# Patient Record
Sex: MALE | Race: BLACK OR AFRICAN AMERICAN | NOT HISPANIC OR LATINO | Employment: FULL TIME | URBAN - METROPOLITAN AREA
[De-identification: names, ages, dates, MRNs, and addresses within clinical notes are randomized per-mention and may not be internally consistent; named-entity substitution may affect disease eponyms.]

---

## 2017-06-12 ENCOUNTER — HOSPITAL ENCOUNTER (EMERGENCY)
Facility: OTHER | Age: 22
Discharge: HOME | End: 2017-06-12
Attending: EMERGENCY MEDICINE

## 2017-06-12 VITALS
RESPIRATION RATE: 18 BRPM | HEART RATE: 83 BPM | OXYGEN SATURATION: 97 % | TEMPERATURE: 100 F | DIASTOLIC BLOOD PRESSURE: 77 MMHG | SYSTOLIC BLOOD PRESSURE: 132 MMHG

## 2017-06-12 DIAGNOSIS — J30.1 SEASONAL ALLERGIC RHINITIS DUE TO POLLEN: Primary | ICD-10-CM

## 2017-06-12 PROCEDURE — 99283 EMERGENCY DEPT VISIT LOW MDM: CPT

## 2017-06-12 PROCEDURE — 25000003 PHARM REV CODE 250: Performed by: EMERGENCY MEDICINE

## 2017-06-12 RX ORDER — PREDNISONE 10 MG/1
10 TABLET ORAL DAILY
Qty: 5 TABLET | Refills: 0 | Status: SHIPPED | OUTPATIENT
Start: 2017-06-12 | End: 2017-06-17

## 2017-06-12 RX ORDER — ACETAMINOPHEN 500 MG
1000 TABLET ORAL
Status: COMPLETED | OUTPATIENT
Start: 2017-06-12 | End: 2017-06-12

## 2017-06-12 RX ADMIN — ACETAMINOPHEN 1000 MG: 500 TABLET ORAL at 09:06

## 2017-06-13 NOTE — ED NOTES
Patient identifiers verified and correct for Asad Bonner.    LOC: The patient is awake, alert and aware of environment with an appropriate affect, the patient is oriented x 3 and speaking appropriately.  APPEARANCE: Patient resting comfortably and in no acute distress, patient is clean and well groomed, patient's clothing is properly fastened.  SKIN: The skin is warm and dry, color consistent with ethnicity, patient has normal skin turgor and moist mucus membranes, skin intact, no breakdown or bruising noted.  MUSCULOSKELETAL: Patient moving all extremities spontaneously, no obvious swelling or deformities noted.  RESPIRATORY: Airway is open and patent, respirations are spontaneous, patient has a normal effort and rate, no accessory muscle use noted, bilateral breath sounds **.  CARDIAC: Patient has a normal rate and regular rhythm, no periphreal edema noted, capillary refill < 3 seconds.  ABDOMEN: Soft and non tender to palpation, no distention noted, normoactive bowel sounds present in all four quadrants.  NEUROLOGIC: PERRL, **mm bilaterally, eyes open spontaneously, behavior appropriate to situation, follows commands, facial expression symmetrical, bilateral hand grasp equal and even, purposeful motor response noted, normal sensation in all extremities when touched with a finger.

## 2017-06-13 NOTE — ED PROVIDER NOTES
"Encounter Date: 6/12/2017       History     Chief Complaint   Patient presents with    URI     cough, cold, congestion, diarrhea, ha x 1 week. "overheating" 100.4 in triage     Review of patient's allergies indicates:   Allergen Reactions    Shellfish containing products      The history is provided by the patient.   URI   The primary symptoms include cough. Primary symptoms do not include fever, sore throat, nausea or vomiting. The current episode started several days ago. This is a new problem. The problem has not changed since onset.  The cough began 6 to 7 days ago. The cough is non-productive. It is exacerbated by allergens.   Symptoms associated with the illness include chills, congestion and rhinorrhea.     Past Medical History:   Diagnosis Date    Asthma      History reviewed. No pertinent surgical history.  History reviewed. No pertinent family history.  Social History   Substance Use Topics    Smoking status: Never Smoker    Smokeless tobacco: Not on file    Alcohol use No     Review of Systems   Constitutional: Positive for chills. Negative for fever.   HENT: Positive for congestion and rhinorrhea. Negative for sore throat.    Respiratory: Positive for cough.    Gastrointestinal: Positive for diarrhea (One or 2 episodes over the last few days). Negative for nausea and vomiting.   All other systems reviewed and are negative.      Physical Exam     Initial Vitals [06/12/17 2138]   BP Pulse Resp Temp SpO2   (!) 129/90 88 18 (!) 100.4 °F (38 °C) 99 %     Physical Exam    Nursing note and vitals reviewed.  Constitutional: Vital signs are normal. He appears well-developed. He is active and cooperative.   HENT:   Head: Normocephalic and atraumatic.   Nose: Mucosal edema and rhinorrhea present. Right sinus exhibits no maxillary sinus tenderness and no frontal sinus tenderness. Left sinus exhibits no maxillary sinus tenderness and no frontal sinus tenderness.   Eyes: Conjunctivae, EOM and lids are normal. " Pupils are equal, round, and reactive to light.   Neck: Trachea normal and full passive range of motion without pain. Neck supple. No thyroid mass present.   Cardiovascular: Normal rate, regular rhythm, S1 normal, S2 normal, normal heart sounds, intact distal pulses and normal pulses.   Abdominal: Soft. Normal appearance, normal aorta and bowel sounds are normal. There is no tenderness. There is no rigidity, no rebound, no guarding, no CVA tenderness, no tenderness at McBurney's point and negative Bella's sign. No hernia.       Musculoskeletal: Normal range of motion.   Lymphadenopathy:     He has no axillary adenopathy.   Neurological: He is alert and oriented to person, place, and time.   Skin: Skin is warm, dry and intact.   Psychiatric: He has a normal mood and affect. His speech is normal and behavior is normal. Judgment and thought content normal. Cognition and memory are normal.         ED Course   Procedures  Labs Reviewed - No data to display          Medical Decision Making:   ED Management:  Patient has a low-grade temp of 100.4.  Most of his physical findings are consistent with ALLERGIC response however cannot rule out a URI as a less likely diagnosis.  Patient will be treated with low-dose steroid and Motrin.                   ED Course     Clinical Impression:   The encounter diagnosis was Seasonal allergic rhinitis due to pollen.          Lewis Mckeon MD  06/12/17 5648

## 2017-10-03 ENCOUNTER — HOSPITAL ENCOUNTER (EMERGENCY)
Facility: OTHER | Age: 22
Discharge: HOME | End: 2017-10-03
Attending: INTERNAL MEDICINE

## 2017-10-03 VITALS
SYSTOLIC BLOOD PRESSURE: 120 MMHG | DIASTOLIC BLOOD PRESSURE: 76 MMHG | TEMPERATURE: 98 F | OXYGEN SATURATION: 98 % | BODY MASS INDEX: 24.25 KG/M2 | WEIGHT: 160 LBS | HEART RATE: 62 BPM | HEIGHT: 68 IN

## 2017-10-03 DIAGNOSIS — T78.1XXA ALLERGIC REACTION TO FOOD, INITIAL ENCOUNTER: Primary | ICD-10-CM

## 2017-10-03 PROCEDURE — 96374 THER/PROPH/DIAG INJ IV PUSH: CPT

## 2017-10-03 PROCEDURE — 96375 TX/PRO/DX INJ NEW DRUG ADDON: CPT

## 2017-10-03 PROCEDURE — 63600175 PHARM REV CODE 636 W HCPCS: Performed by: INTERNAL MEDICINE

## 2017-10-03 PROCEDURE — 63600175 PHARM REV CODE 636 W HCPCS

## 2017-10-03 PROCEDURE — 99284 EMERGENCY DEPT VISIT MOD MDM: CPT | Mod: 25

## 2017-10-03 RX ORDER — EPINEPHRINE 0.3 MG/.3ML
1 INJECTION SUBCUTANEOUS
Qty: 2 EACH | Refills: 0 | Status: SHIPPED | OUTPATIENT
Start: 2017-10-03 | End: 2019-10-12 | Stop reason: SDUPTHER

## 2017-10-03 RX ORDER — METHYLPREDNISOLONE SOD SUCC 125 MG
VIAL (EA) INJECTION
Status: DISCONTINUED
Start: 2017-10-03 | End: 2017-10-03 | Stop reason: HOSPADM

## 2017-10-03 RX ORDER — DIPHENHYDRAMINE HYDROCHLORIDE 50 MG/ML
50 INJECTION INTRAMUSCULAR; INTRAVENOUS
Status: COMPLETED | OUTPATIENT
Start: 2017-10-03 | End: 2017-10-03

## 2017-10-03 RX ORDER — METHYLPREDNISOLONE SOD SUCC 125 MG
250 VIAL (EA) INJECTION
Status: COMPLETED | OUTPATIENT
Start: 2017-10-03 | End: 2017-10-03

## 2017-10-03 RX ORDER — PREDNISONE 20 MG/1
40 TABLET ORAL DAILY
Qty: 10 TABLET | Refills: 0 | Status: SHIPPED | OUTPATIENT
Start: 2017-10-03 | End: 2017-10-08

## 2017-10-03 RX ORDER — DIPHENHYDRAMINE HYDROCHLORIDE 50 MG/ML
INJECTION INTRAMUSCULAR; INTRAVENOUS
Status: COMPLETED
Start: 2017-10-03 | End: 2017-10-03

## 2017-10-03 RX ADMIN — DIPHENHYDRAMINE HYDROCHLORIDE 50 MG: 50 INJECTION INTRAMUSCULAR; INTRAVENOUS at 05:10

## 2017-10-03 RX ADMIN — DIPHENHYDRAMINE HYDROCHLORIDE 50 MG: 50 INJECTION, SOLUTION INTRAMUSCULAR; INTRAVENOUS at 05:10

## 2017-10-03 RX ADMIN — METHYLPREDNISOLONE SODIUM SUCCINATE 250 MG: 125 INJECTION, POWDER, FOR SOLUTION INTRAMUSCULAR; INTRAVENOUS at 05:10

## 2017-10-03 NOTE — ED PROVIDER NOTES
Encounter Date: 10/3/2017       History     Chief Complaint   Patient presents with    Allergic Reaction     reports drinking juice that a family member has touched after preparing seafood, pt allergic to seafood, clearing throat very frequently, reports thraot scratchy, able to speak in short sentences     22-year-old male presents to the emergency department complaining of discomfort after eating a seafood particle mistakenly today.  One of patient's younger relatives left a seafood particle on a juice box and the patient did not realize the particle was on a juice box before he drank it.      The history is provided by the patient. No  was used.   Allergic Reaction   Primary symptoms comment: Throat discomfort. The current episode started 1 to 2 hours ago. The problem has not changed since onset.  The onset of the reaction was associated with eating. Significant symptoms that are not present include eye redness, flushing, rhinorrhea or itching.     Review of patient's allergies indicates:   Allergen Reactions    Shellfish containing products      Past Medical History:   Diagnosis Date    Asthma      History reviewed. No pertinent surgical history.  History reviewed. No pertinent family history.  Social History   Substance Use Topics    Smoking status: Current Every Day Smoker    Smokeless tobacco: Never Used    Alcohol use No     Review of Systems   Constitutional: Negative.    HENT: Positive for sore throat. Negative for rhinorrhea and trouble swallowing.    Eyes: Negative for redness.   Respiratory: Negative.    Cardiovascular: Negative.    Skin: Negative for flushing and itching.   All other systems reviewed and are negative.      Physical Exam     Initial Vitals   BP Pulse Resp Temp SpO2   10/03/17 1726 10/03/17 1726 -- 10/03/17 1731 10/03/17 1726   133/77 70  98 °F (36.7 °C) 99 %      MAP       10/03/17 1726       95.67         Physical Exam    Nursing note and vitals  reviewed.  Constitutional: He appears well-developed and well-nourished.   HENT:   Head: Normocephalic and atraumatic.   Slight posterior oropharyngeal erythema with slight edema and no exudate   Eyes: Conjunctivae and EOM are normal.   Neck: Normal range of motion.   Cardiovascular: Normal rate, regular rhythm and normal heart sounds.   Pulmonary/Chest: Breath sounds normal. No respiratory distress. He has no wheezes.   Abdominal: Soft. Bowel sounds are normal. He exhibits no distension.   Musculoskeletal: Normal range of motion.   Neurological: He is alert.   Skin: Skin is warm and dry.   Psychiatric: He has a normal mood and affect. His behavior is normal. Thought content normal.         ED Course   Procedures  Labs Reviewed - No data to display          Medical Decision Making:   Initial Assessment:   22-year-old male presents to the emergency department complaining of discomfort after eating a seafood particle mistakenly today.  One of patient's younger relatives left a seafood particle on a juice box and the patient did not realize the particle was on a juice box before he drank it.    Differential Diagnosis:   ALLERGIC reaction to seafood  ED Management:  Patient was given Solu-Medrol and Benadryl intravenously after saline lock was placed.  Patient initially was clearing his throat approximately every 7-10 seconds.  Within a matter of 10 minutes of receiving Solu-Medrol and Benadryl, patient states his throat is back to normal.  Patient states he does not have an EpiPen at home, so he was given a prescription for an EpiPen and a prednisone burst.  He was advised to follow-up with his primary care physician tomorrow for reevaluation.                   ED Course      Clinical Impression:   The encounter diagnosis was Allergic reaction to food, initial encounter.    Disposition:   Disposition: Discharged  Condition: Stable                        Howard Griggs MD  10/03/17 5341

## 2019-06-30 ENCOUNTER — HOSPITAL ENCOUNTER (EMERGENCY)
Facility: HOSPITAL | Age: 24
Discharge: HOME | End: 2019-06-30
Attending: EMERGENCY MEDICINE

## 2019-06-30 VITALS
HEART RATE: 68 BPM | SYSTOLIC BLOOD PRESSURE: 128 MMHG | WEIGHT: 160 LBS | OXYGEN SATURATION: 100 % | TEMPERATURE: 98 F | DIASTOLIC BLOOD PRESSURE: 74 MMHG | RESPIRATION RATE: 18 BRPM | BODY MASS INDEX: 24.33 KG/M2

## 2019-06-30 DIAGNOSIS — N34.1 URETHRITIS, NONSPECIFIC: Primary | ICD-10-CM

## 2019-06-30 PROCEDURE — 25000003 PHARM REV CODE 250: Mod: ER | Performed by: EMERGENCY MEDICINE

## 2019-06-30 PROCEDURE — 63600175 PHARM REV CODE 636 W HCPCS: Mod: ER | Performed by: EMERGENCY MEDICINE

## 2019-06-30 PROCEDURE — 99284 EMERGENCY DEPT VISIT MOD MDM: CPT | Mod: 25,ER

## 2019-06-30 PROCEDURE — 96372 THER/PROPH/DIAG INJ SC/IM: CPT | Mod: ER

## 2019-06-30 PROCEDURE — 87491 CHLMYD TRACH DNA AMP PROBE: CPT

## 2019-06-30 RX ORDER — AZITHROMYCIN 250 MG/1
1000 TABLET, FILM COATED ORAL
Status: COMPLETED | OUTPATIENT
Start: 2019-06-30 | End: 2019-06-30

## 2019-06-30 RX ORDER — CEFTRIAXONE 250 MG/1
250 INJECTION, POWDER, FOR SOLUTION INTRAMUSCULAR; INTRAVENOUS
Status: COMPLETED | OUTPATIENT
Start: 2019-06-30 | End: 2019-06-30

## 2019-06-30 RX ADMIN — AZITHROMYCIN MONOHYDRATE 1000 MG: 250 TABLET ORAL at 11:06

## 2019-06-30 RX ADMIN — CEFTRIAXONE SODIUM 250 MG: 250 INJECTION, POWDER, FOR SOLUTION INTRAMUSCULAR; INTRAVENOUS at 11:06

## 2019-06-30 NOTE — ED PROVIDER NOTES
"Encounter Date: 6/30/2019    SCRIBE #1 NOTE: I, Bianca Fuentes, am scribing for, and in the presence of,  Dr. Coronel. I have scribed the following portions of the note - Other sections scribed: HPI, ROS, PE.       History     Chief Complaint   Patient presents with    Exposure to STD     Pt states," Yellow discharge and pain when I pee."     23 year old male complaining of dysuria x 2 days. Patient is experiencing yellow discharge beginning today. He denies fever, nausea, vomiting, and abdominal pain. Patient reports having unprotected oral sex with someone who was recently diagnosed with gonorrhea    The history is provided by the patient. No  was used.     Review of patient's allergies indicates:   Allergen Reactions    Shellfish containing products      Past Medical History:   Diagnosis Date    Asthma      History reviewed. No pertinent surgical history.  History reviewed. No pertinent family history.  Social History     Tobacco Use    Smoking status: Current Every Day Smoker    Smokeless tobacco: Never Used   Substance Use Topics    Alcohol use: No    Drug use: Not on file     Review of Systems   Constitutional: Negative for fever.   Eyes: Negative for redness.   Gastrointestinal: Negative for abdominal pain, nausea and vomiting.   Genitourinary: Positive for discharge (yellow) and dysuria.   Skin: Negative for rash and wound.       Physical Exam     Initial Vitals [06/30/19 1047]   BP Pulse Resp Temp SpO2   138/72 67 16 98.5 °F (36.9 °C) 100 %      MAP       --         Physical Exam    Nursing note and vitals reviewed.  Constitutional: He appears well-developed and well-nourished.   HENT:   Head: Normocephalic and atraumatic.   Right Ear: External ear normal.   Left Ear: External ear normal.   Eyes: Conjunctivae are normal.   Neck: Neck supple.   Pulmonary/Chest: No respiratory distress.   Abdominal: Normal appearance.   Genitourinary: Circumcised. Discharge (yellow) found. "   Neurological: He is alert and oriented to person, place, and time.   Skin: Skin is warm and dry.   Psychiatric: He has a normal mood and affect.         ED Course   Procedures  Labs Reviewed   C. TRACHOMATIS/N. GONORRHOEAE BY AMP DNA          Imaging Results    None          Medical Decision Making:   Initial Assessment:   23 year old male with dysuria x 2 days and yellow penile discharge since yesterday. Patient denies fever, nausea, vomiting, or abdominal pain. Physical exam is significant for circumcised penis and yellow penile discharge.   ED Management:  Will treat with cefTRIAXone injection 250 mg and azithromycin tablet 1,000 mg.  Will order C. trachomatis/N. gonorrhoeae by AMP DNA.                Scribe Attestation:   Scribe #1: I performed the above scribed service and the documentation accurately describes the services I performed. I attest to the accuracy of the note.           I, Dr. Joanne Coronel, personally performed the services described in this documentation. All medical record entries made by the scribe were at my direction and in my presence.  I have reviewed the chart and agree that the record reflects my personal performance and is accurate and complete. Joanne Coronel MD.  12:26 PM 06/30/2019          Clinical Impression:     1. Urethritis, nonspecific                                   Joanne Coronel MD  06/30/19 6069

## 2019-07-01 LAB
C TRACH DNA SPEC QL NAA+PROBE: NOT DETECTED
N GONORRHOEA DNA SPEC QL NAA+PROBE: DETECTED

## 2019-10-11 ENCOUNTER — HOSPITAL ENCOUNTER (EMERGENCY)
Facility: HOSPITAL | Age: 24
Discharge: HOME | End: 2019-10-12
Attending: EMERGENCY MEDICINE

## 2019-10-11 DIAGNOSIS — T78.40XA ALLERGIC REACTION, INITIAL ENCOUNTER: ICD-10-CM

## 2019-10-11 DIAGNOSIS — L50.9 HIVES: Primary | ICD-10-CM

## 2019-10-11 PROCEDURE — 63600175 PHARM REV CODE 636 W HCPCS: Performed by: EMERGENCY MEDICINE

## 2019-10-11 PROCEDURE — 99284 PR EMERGENCY DEPT VISIT,LEVEL IV: ICD-10-PCS | Mod: ,,, | Performed by: EMERGENCY MEDICINE

## 2019-10-11 PROCEDURE — 99283 EMERGENCY DEPT VISIT LOW MDM: CPT

## 2019-10-11 PROCEDURE — 99284 EMERGENCY DEPT VISIT MOD MDM: CPT | Mod: ,,, | Performed by: EMERGENCY MEDICINE

## 2019-10-11 RX ADMIN — DEXAMETHASONE 10 MG: 4 TABLET ORAL at 11:10

## 2019-10-12 VITALS
WEIGHT: 175 LBS | DIASTOLIC BLOOD PRESSURE: 60 MMHG | HEIGHT: 66 IN | BODY MASS INDEX: 28.12 KG/M2 | RESPIRATION RATE: 16 BRPM | TEMPERATURE: 98 F | HEART RATE: 68 BPM | OXYGEN SATURATION: 98 % | SYSTOLIC BLOOD PRESSURE: 101 MMHG

## 2019-10-12 RX ORDER — EPINEPHRINE 0.3 MG/.3ML
1 INJECTION SUBCUTANEOUS
Qty: 2 EACH | Refills: 0 | Status: SHIPPED | OUTPATIENT
Start: 2019-10-12 | End: 2020-10-11

## 2019-10-12 NOTE — DISCHARGE INSTRUCTIONS
Benadryl PRN (as needed) for signs and symptoms of allergic reaction (I.e. Itching, hives, etc.) Should you become short of breath or begin to experience facial swelling, unable to tolerate own secretions, or difficulty breathing use Epipen as prescribed to you. Should symptoms not jewels with at home medications, seek emergent medical attention.

## 2019-10-12 NOTE — ED PROVIDER NOTES
Encounter Date: 10/11/2019       History     Chief Complaint   Patient presents with    Abdominal Pain     Patient ate seafood at wedding this evening, patient has seafood allergy. Now c/o abdominal pain. Minimal swelling noted to eyes. ETOH on board. Patient denies any SOB    Allergic Reaction     Mr. Bonner is a 24-year-old  male with no known past medical history who presented to [facility] ED on 10/11 via EMS after patient ingested shrimp at a reception. Patient with known seafood allergy, experienced hives involving bilateral cheeks. Also with complaints of anxiety and nausea at the time. He reports alcohol consumption tonight. En route to the ED, patient received 50 mg Benadryl. At time of presentation patients' symptoms appear to have resolved. Patients' vitals stable with /72 mmHg, HR 72 bpm, afebrile with temperature of 98.4 F, and RR 18 saturating 99% on room air. Currently he denies any symptoms of facial swallowing, globus sensation in throat, SOB, or chest pain.        Review of patient's allergies indicates:   Allergen Reactions    Shellfish containing products      Past Medical History:   Diagnosis Date    Asthma      No past surgical history on file.  No family history on file.  Social History     Tobacco Use    Smoking status: Current Every Day Smoker    Smokeless tobacco: Never Used   Substance Use Topics    Alcohol use: No    Drug use: Not on file     Review of Systems   Constitutional: Negative for chills, diaphoresis and fever.   HENT: Negative for drooling, facial swelling and trouble swallowing.    Eyes: Negative for pain and visual disturbance.   Respiratory: Negative for cough, choking, chest tightness, shortness of breath and wheezing.    Cardiovascular: Negative for chest pain and leg swelling.   Gastrointestinal: Positive for nausea. Negative for abdominal distention, abdominal pain, constipation, diarrhea and vomiting.   Genitourinary: Negative for dysuria and  hematuria.   Musculoskeletal: Negative for back pain and neck pain.   Skin: Positive for rash. Negative for pallor.   Neurological: Negative for dizziness and headaches.   Psychiatric/Behavioral: Negative for confusion. The patient is nervous/anxious.        Physical Exam     Initial Vitals [10/11/19 2243]   BP Pulse Resp Temp SpO2   107/72 72 18 98.4 °F (36.9 °C) 99 %      MAP       --         Physical Exam    Nursing note and vitals reviewed.  Constitutional: He appears well-developed and well-nourished.  Non-toxic appearance. He does not appear ill. No distress.   HENT:   Head: Normocephalic and atraumatic.   Mouth/Throat: Oropharynx is clear and moist. No oropharyngeal exudate.   Eyes: Conjunctivae and EOM are normal. Pupils are equal, round, and reactive to light. No scleral icterus.   Neck: Normal range of motion. Neck supple.   Cardiovascular: Normal rate, regular rhythm, normal heart sounds and intact distal pulses. Exam reveals no gallop and no friction rub.    No murmur heard.  Pulmonary/Chest: Breath sounds normal. No stridor. He has no wheezes. He has no rhonchi. He has no rales.   Abdominal: Soft. Bowel sounds are normal. He exhibits no distension. There is no tenderness.   Musculoskeletal: He exhibits no edema.   Neurological: He is alert and oriented to person, place, and time. He has normal strength.   Skin: Skin is warm and dry.   Psychiatric: He has a normal mood and affect. Thought content normal.   Somnolent s/p 50 mg Benadryl and alcohol, but easily arousable           ED Course   Procedures  Labs Reviewed - No data to display       Imaging Results    None          Medical Decision Making:   History:   Old Medical Records: I decided to obtain old medical records.  Initial Assessment:   24 AAM with known seafood allergy presented to [facility] ED on 10/11 via EMS after patient ingested shrimp at a reception began experiencing hives involving bilateral cheeks and some anxiety and nausea. En route to the  ED, patient received 50 mg Benadryl. At time of presentation patients' symptoms appear to have resolved. Patients' vitals stable with /72 mmHg, HR 72 bpm, afebrile with temperature of 98.4 F, and RR 18 saturating 99% on room air. On physical exam symptoms appear to have resolved. Currently he denies any symptoms of facial swelling, globus sensation in throat, SOB, or chest pain.        Differential Diagnosis:   Differential Diagnosis includes but is not limited to:  Hives  Allergic reaction  Anaphylaxis  Alcohol intoxication   ED Management:  11:46 PM  Patient seen and examined. Symptoms seem to have resolved. Will give dexamethasone 10 mg PO now and continue to monitor.    1:07 AM  Patient with marked improvement in symptoms s/p Benadryl and dexamethasone. Will refill prescription for Epipen.                       Clinical Impression:       ICD-10-CM ICD-9-CM   1. Hives L50.9 708.9   2. Allergic reaction, initial encounter T78.40XA 995.3                                Cristobal Her MD  Resident  10/12/19 0109

## 2019-10-12 NOTE — ED TRIAGE NOTES
Pt reports eating seafood at a wedding reception and c/o facial hives that resolved PTA. Pt has minimal swelling to eyes. No other complaints asked. Alcohol on board.

## 2019-10-12 NOTE — ED NOTES
Pt awake and alert; resting quietly on stretcher. VS stable. Pt denies pain at this time; no acute distress or discomfort reported or observed.  Pt denies restroom needs at this time; is able to reposition self on stretcher. Bed locked in lowest position; side rails up and locked x 2; call light, bedside table, and personal belongings within reach. Room assessed for safety measures and cleanliness; no action needed at this time. Plan of care discussed.  Pt instructed to alert nurse for assistance and before attempting to get out of bed; verbalizes understanding. Pt denies needs or complaints at this time; will continue to monitor.

## 2020-03-21 ENCOUNTER — HOSPITAL ENCOUNTER (EMERGENCY)
Facility: HOSPITAL | Age: 25
Discharge: HOME | End: 2020-03-21
Attending: EMERGENCY MEDICINE
Payer: COMMERCIAL

## 2020-03-21 VITALS
SYSTOLIC BLOOD PRESSURE: 132 MMHG | WEIGHT: 157 LBS | DIASTOLIC BLOOD PRESSURE: 80 MMHG | BODY MASS INDEX: 24.64 KG/M2 | OXYGEN SATURATION: 100 % | RESPIRATION RATE: 20 BRPM | HEART RATE: 82 BPM | HEIGHT: 67 IN | TEMPERATURE: 99 F

## 2020-03-21 DIAGNOSIS — R10.33 PERIUMBILICAL ABDOMINAL PAIN: ICD-10-CM

## 2020-03-21 DIAGNOSIS — R11.2 NON-INTRACTABLE VOMITING WITH NAUSEA, UNSPECIFIED VOMITING TYPE: Primary | ICD-10-CM

## 2020-03-21 DIAGNOSIS — R30.0 DYSURIA: ICD-10-CM

## 2020-03-21 LAB
BACTERIA #/AREA URNS HPF: ABNORMAL /HPF
BILIRUB UR QL STRIP: NEGATIVE
CLARITY UR: CLEAR
COLOR UR: YELLOW
GLUCOSE UR QL STRIP: NEGATIVE
HGB UR QL STRIP: NEGATIVE
KETONES UR QL STRIP: ABNORMAL
LEUKOCYTE ESTERASE UR QL STRIP: ABNORMAL
MICROSCOPIC COMMENT: ABNORMAL
NITRITE UR QL STRIP: NEGATIVE
PH UR STRIP: 6 [PH] (ref 5–8)
PROT UR QL STRIP: NEGATIVE
SP GR UR STRIP: 1.02 (ref 1–1.03)
URN SPEC COLLECT METH UR: ABNORMAL
UROBILINOGEN UR STRIP-ACNC: ABNORMAL EU/DL
WBC #/AREA URNS HPF: 35 /HPF (ref 0–5)

## 2020-03-21 PROCEDURE — 87086 URINE CULTURE/COLONY COUNT: CPT

## 2020-03-21 PROCEDURE — 63600175 PHARM REV CODE 636 W HCPCS: Performed by: PHYSICIAN ASSISTANT

## 2020-03-21 PROCEDURE — 81000 URINALYSIS NONAUTO W/SCOPE: CPT

## 2020-03-21 PROCEDURE — 99284 EMERGENCY DEPT VISIT MOD MDM: CPT | Mod: 25

## 2020-03-21 PROCEDURE — 25000003 PHARM REV CODE 250: Performed by: PHYSICIAN ASSISTANT

## 2020-03-21 PROCEDURE — 87591 N.GONORRHOEAE DNA AMP PROB: CPT

## 2020-03-21 PROCEDURE — 96372 THER/PROPH/DIAG INJ SC/IM: CPT

## 2020-03-21 RX ORDER — LIDOCAINE HYDROCHLORIDE 10 MG/ML
5 INJECTION INFILTRATION; PERINEURAL
Status: COMPLETED | OUTPATIENT
Start: 2020-03-21 | End: 2020-03-21

## 2020-03-21 RX ORDER — AZITHROMYCIN 250 MG/1
1000 TABLET, FILM COATED ORAL
Status: COMPLETED | OUTPATIENT
Start: 2020-03-21 | End: 2020-03-21

## 2020-03-21 RX ORDER — DICYCLOMINE HYDROCHLORIDE 20 MG/1
20 TABLET ORAL 4 TIMES DAILY PRN
Qty: 30 TABLET | Refills: 0 | Status: SHIPPED | OUTPATIENT
Start: 2020-03-21 | End: 2020-04-20

## 2020-03-21 RX ORDER — PANTOPRAZOLE SODIUM 20 MG/1
20 TABLET, DELAYED RELEASE ORAL DAILY
Qty: 30 TABLET | Refills: 0 | Status: SHIPPED | OUTPATIENT
Start: 2020-03-21 | End: 2021-03-21

## 2020-03-21 RX ORDER — CEFTRIAXONE 1 G/1
250 INJECTION, POWDER, FOR SOLUTION INTRAMUSCULAR; INTRAVENOUS
Status: COMPLETED | OUTPATIENT
Start: 2020-03-21 | End: 2020-03-21

## 2020-03-21 RX ORDER — ONDANSETRON 4 MG/1
4 TABLET, ORALLY DISINTEGRATING ORAL EVERY 6 HOURS PRN
Qty: 20 TABLET | Refills: 0 | Status: SHIPPED | OUTPATIENT
Start: 2020-03-21

## 2020-03-21 RX ADMIN — LIDOCAINE HYDROCHLORIDE 5 ML: 10 INJECTION, SOLUTION INFILTRATION; PERINEURAL at 11:03

## 2020-03-21 RX ADMIN — CEFTRIAXONE SODIUM 250 MG: 1 INJECTION, POWDER, FOR SOLUTION INTRAMUSCULAR; INTRAVENOUS at 11:03

## 2020-03-21 RX ADMIN — AZITHROMYCIN 1000 MG: 250 TABLET, FILM COATED ORAL at 11:03

## 2020-03-22 NOTE — ED PROVIDER NOTES
Encounter Date: 3/21/2020       History     Chief Complaint   Patient presents with    Nausea     started today after he ate wings at dinner    Vomiting    Abdominal Pain     cramping     21-year-old male with history of asthma presents to ED complaining of acute onset nausea vomiting abdominal pain after eating some fried wings from a local restaurant.  Cramping periumbilical pain.  One episode of nonbloody nonbilious emesis.  Denies history of any GI issues.  No diarrhea.  He does admit to history of constipation, last BM approximately 2-3 days ago, which he states is normal.  Does not feel distended or bloated, does not suspect significant constipation.    Also with a few days worth of dysuria, primarily in the mornings and in the evenings.  No hematuria.  No flank pain.  No history of frequent UTIs.  No suspicion for STI.    No URI symptoms.  No fever.  No shortness of breath.        Review of patient's allergies indicates:   Allergen Reactions    Shellfish containing products      Past Medical History:   Diagnosis Date    Asthma      No past surgical history on file.  No family history on file.  Social History     Tobacco Use    Smoking status: Current Every Day Smoker    Smokeless tobacco: Never Used   Substance Use Topics    Alcohol use: No    Drug use: Not on file     Review of Systems   Constitutional: Negative for chills and fever.   HENT: Negative for congestion.    Eyes: Negative for discharge and redness.   Respiratory: Negative for cough, chest tightness and shortness of breath.    Cardiovascular: Negative for leg swelling.   Gastrointestinal: Positive for abdominal pain, constipation, nausea and vomiting. Negative for blood in stool and diarrhea.   Genitourinary: Positive for dysuria. Negative for flank pain, frequency, penile pain and testicular pain.   Musculoskeletal: Negative for myalgias, neck pain and neck stiffness.   Skin: Negative for rash.   Neurological: Negative for dizziness and  headaches.       Physical Exam     Initial Vitals [03/21/20 2109]   BP Pulse Resp Temp SpO2   129/77 75 15 98.6 °F (37 °C) 97 %      MAP       --         Physical Exam    Nursing note and vitals reviewed.  Constitutional: He appears well-developed and well-nourished. He is not diaphoretic. No distress.   Well-appearing and nontoxic.  Resting comfortably on exam table.   HENT:   Head: Normocephalic and atraumatic.   Eyes: EOM are normal.   Neck: Neck supple.   Cardiovascular: Intact distal pulses.   Pulmonary/Chest: No respiratory distress.   Abdominal:   Abdomen overall soft, normal bowel sounds ×4. Mild ttp periumbilical region.  No rebound or guarding.  No palpable mass or distention.  No flank or CVA tenderness.  Negative Bella sign.  No pain over McBurney's point.   Neurological: He is alert and oriented to person, place, and time.   Skin: Skin is warm and dry.   Psychiatric: He has a normal mood and affect. Thought content normal.         ED Course   Procedures  Labs Reviewed   URINALYSIS, REFLEX TO URINE CULTURE - Abnormal; Notable for the following components:       Result Value    Ketones, UA Trace (*)     Urobilinogen, UA 2.0-3.0 (*)     Leukocytes, UA 2+ (*)     All other components within normal limits    Narrative:     Preferred Collection Type->Urine, Clean Catch   URINALYSIS MICROSCOPIC - Abnormal; Notable for the following components:    WBC, UA 35 (*)     Bacteria Few (*)     All other components within normal limits    Narrative:     Preferred Collection Type->Urine, Clean Catch   CULTURE, URINE   C. TRACHOMATIS/N. GONORRHOEAE BY AMP DNA          Imaging Results    None          Medical Decision Making:   Differential Diagnosis:   UTI, STI, viral gastroenteritis, GERD, gastritis, constipation, diverticulosis, diverticulitis  ED Management:  No local peritoneal signs, do not suspect surgical abdomen. Supportive measures for likely viral gastroenteritis vs nonspecific enteritis 2/2 recent foods. No  fever. No bloody or mucoid stools. No significant comorbidities. No SOB, no URI complaints, no COVID contacts. Do not think represents coronavirus GI presentation; regardless, do not feel need for admit or further intervention. Home quarantine, supportive care.     Hx STI; given urine and dysuria, will empirically treat.                                  Clinical Impression:       ICD-10-CM ICD-9-CM   1. Non-intractable vomiting with nausea, unspecified vomiting type R11.2 787.01   2. Dysuria R30.0 788.1   3. Periumbilical abdominal pain R10.33 789.05         Disposition:   Disposition: Discharged  Condition: Stable     ED Disposition Condition    Discharge Stable        ED Prescriptions     Medication Sig Dispense Start Date End Date Auth. Provider    dicyclomine (BENTYL) 20 mg tablet Take 1 tablet (20 mg total) by mouth 4 (four) times daily as needed (abdominal cramping). 30 tablet 3/21/2020 4/20/2020 John Donovan PA-C    ondansetron (ZOFRAN-ODT) 4 MG TbDL Take 1 tablet (4 mg total) by mouth every 6 (six) hours as needed (nausea). 20 tablet 3/21/2020  John Donovan PA-C    pantoprazole (PROTONIX) 20 MG tablet Take 1 tablet (20 mg total) by mouth once daily. 30 tablet 3/21/2020 3/21/2021 John Donovan PA-C        Follow-up Information     Follow up With Specialties Details Why Contact [facility] - Family Medicine Family Medicine Schedule an appointment as soon as possible for a visit  To establish primary care physician, For reevaluation [address] [facility] [address]  [phone]      [facility]  Schedule an appointment as soon as possible for a visit  To establish primary care physician, for reevaluation [address] [place] [address]  [phone]      [facility] Emergency Medicine  As needed, If symptoms worsen [address]  [facility] [address] [phone]                                     John Donovan  LISA  03/22/20 0426

## 2020-03-23 LAB
BACTERIA UR CULT: NO GROWTH
C TRACH DNA SPEC QL NAA+PROBE: DETECTED
N GONORRHOEA DNA SPEC QL NAA+PROBE: DETECTED

## 2020-08-24 ENCOUNTER — ANESTHESIA EVENT (OUTPATIENT)
Dept: SURGERY | Facility: HOSPITAL | Age: 25
End: 2020-08-24
Payer: COMMERCIAL

## 2020-08-24 ENCOUNTER — HOSPITAL ENCOUNTER (OUTPATIENT)
Facility: HOSPITAL | Age: 25
Discharge: HOME | End: 2020-08-26
Attending: EMERGENCY MEDICINE | Admitting: SURGERY
Payer: COMMERCIAL

## 2020-08-24 DIAGNOSIS — Z01.810 PREOP CARDIOVASCULAR EXAM: ICD-10-CM

## 2020-08-24 DIAGNOSIS — K37 APPENDICITIS, UNSPECIFIED APPENDICITIS TYPE: Primary | ICD-10-CM

## 2020-08-24 DIAGNOSIS — K37 APPENDICITIS: ICD-10-CM

## 2020-08-24 DIAGNOSIS — R10.9 ABDOMINAL PAIN: ICD-10-CM

## 2020-08-24 LAB
ABO + RH BLD: NORMAL
ALBUMIN SERPL-MCNC: 4.2 G/DL (ref 3.3–5.5)
ALBUMIN SERPL-MCNC: 4.3 G/DL (ref 3.3–5.5)
ALLENS TEST: ABNORMAL
ALP SERPL-CCNC: 58 U/L (ref 42–141)
ALP SERPL-CCNC: 63 U/L (ref 42–141)
BILIRUB SERPL-MCNC: 0.6 MG/DL (ref 0.2–1.6)
BILIRUB SERPL-MCNC: 0.6 MG/DL (ref 0.2–1.6)
BILIRUBIN, POC UA: ABNORMAL
BLD GP AB SCN CELLS X3 SERPL QL: NORMAL
BLOOD, POC UA: NEGATIVE
BUN SERPL-MCNC: 7 MG/DL (ref 7–22)
CALCIUM SERPL-MCNC: 10 MG/DL (ref 8–10.3)
CHLORIDE SERPL-SCNC: 101 MMOL/L (ref 98–108)
CLARITY, POC UA: CLEAR
COLOR, POC UA: YELLOW
CREAT SERPL-MCNC: 0.9 MG/DL (ref 0.6–1.2)
CTP QC/QA: YES
GLUCOSE SERPL-MCNC: 102 MG/DL (ref 73–118)
GLUCOSE, POC UA: NEGATIVE
HCO3 UR-SCNC: 22.5 MMOL/L (ref 24–28)
KETONES, POC UA: NEGATIVE
LDH SERPL L TO P-CCNC: 4.07 MMOL/L (ref 0.5–2.2)
LEUKOCYTE EST, POC UA: NEGATIVE
NITRITE, POC UA: NEGATIVE
PCO2 BLDA: 34.3 MMHG (ref 35–45)
PH SMN: 7.42 [PH] (ref 7.35–7.45)
PH UR STRIP: 5.5 [PH]
PO2 BLDA: 29 MMHG (ref 40–60)
POC ALT (SGPT): 22 U/L (ref 10–47)
POC ALT (SGPT): 22 U/L (ref 10–47)
POC AMYLASE: 50 U/L (ref 14–97)
POC AST (SGOT): 26 U/L (ref 11–38)
POC AST (SGOT): 33 U/L (ref 11–38)
POC BE: -1 MMOL/L
POC GGT: 30 U/L (ref 5–65)
POC PTINR: 1.2 (ref 0.9–1.2)
POC PTWBT: 13.9 SEC (ref 9.7–14.3)
POC SATURATED O2: 58 % (ref 95–100)
POC TCO2: 24 MMOL/L (ref 24–29)
POC TCO2: 25 MMOL/L (ref 18–33)
POTASSIUM BLD-SCNC: 4 MMOL/L (ref 3.6–5.1)
PROTEIN, POC UA: NEGATIVE
PROTEIN, POC: 8.1 G/DL (ref 6.4–8.1)
PROTEIN, POC: 8.1 G/DL (ref 6.4–8.1)
SAMPLE: ABNORMAL
SAMPLE: NORMAL
SARS-COV-2 RDRP RESP QL NAA+PROBE: NEGATIVE
SITE: ABNORMAL
SODIUM BLD-SCNC: 142 MMOL/L (ref 128–145)
SPECIFIC GRAVITY, POC UA: >=1.03
UROBILINOGEN, POC UA: 0.2 E.U./DL

## 2020-08-24 PROCEDURE — 99291 CRITICAL CARE FIRST HOUR: CPT | Mod: 25,ER

## 2020-08-24 PROCEDURE — 63600175 PHARM REV CODE 636 W HCPCS: Mod: ER | Performed by: EMERGENCY MEDICINE

## 2020-08-24 PROCEDURE — 93005 ELECTROCARDIOGRAM TRACING: CPT | Mod: ER

## 2020-08-24 PROCEDURE — 85610 PROTHROMBIN TIME: CPT | Mod: ER

## 2020-08-24 PROCEDURE — 25500020 PHARM REV CODE 255: Mod: ER | Performed by: EMERGENCY MEDICINE

## 2020-08-24 PROCEDURE — 80053 COMPREHEN METABOLIC PANEL: CPT | Mod: ER

## 2020-08-24 PROCEDURE — 82803 BLOOD GASES ANY COMBINATION: CPT | Mod: ER

## 2020-08-24 PROCEDURE — 96361 HYDRATE IV INFUSION ADD-ON: CPT

## 2020-08-24 PROCEDURE — 87040 BLOOD CULTURE FOR BACTERIA: CPT | Mod: 59

## 2020-08-24 PROCEDURE — 63600175 PHARM REV CODE 636 W HCPCS: Performed by: EMERGENCY MEDICINE

## 2020-08-24 PROCEDURE — G0378 HOSPITAL OBSERVATION PER HR: HCPCS

## 2020-08-24 PROCEDURE — 85025 COMPLETE CBC W/AUTO DIFF WBC: CPT | Mod: ER

## 2020-08-24 PROCEDURE — 36415 COLL VENOUS BLD VENIPUNCTURE: CPT

## 2020-08-24 PROCEDURE — S0028 INJECTION, FAMOTIDINE, 20 MG: HCPCS | Mod: ER | Performed by: EMERGENCY MEDICINE

## 2020-08-24 PROCEDURE — 96365 THER/PROPH/DIAG IV INF INIT: CPT

## 2020-08-24 PROCEDURE — 96375 TX/PRO/DX INJ NEW DRUG ADDON: CPT | Mod: ER

## 2020-08-24 PROCEDURE — 82150 ASSAY OF AMYLASE: CPT | Mod: ER

## 2020-08-24 PROCEDURE — 86850 RBC ANTIBODY SCREEN: CPT

## 2020-08-24 PROCEDURE — 25000003 PHARM REV CODE 250: Performed by: STUDENT IN AN ORGANIZED HEALTH CARE EDUCATION/TRAINING PROGRAM

## 2020-08-24 PROCEDURE — 25000003 PHARM REV CODE 250: Mod: ER | Performed by: EMERGENCY MEDICINE

## 2020-08-24 PROCEDURE — 96376 TX/PRO/DX INJ SAME DRUG ADON: CPT

## 2020-08-24 PROCEDURE — U0002 COVID-19 LAB TEST NON-CDC: HCPCS | Mod: ER | Performed by: EMERGENCY MEDICINE

## 2020-08-24 PROCEDURE — 87086 URINE CULTURE/COLONY COUNT: CPT

## 2020-08-24 PROCEDURE — 81003 URINALYSIS AUTO W/O SCOPE: CPT | Mod: ER

## 2020-08-24 PROCEDURE — 94761 N-INVAS EAR/PLS OXIMETRY MLT: CPT

## 2020-08-24 PROCEDURE — 25000003 PHARM REV CODE 250: Performed by: EMERGENCY MEDICINE

## 2020-08-24 PROCEDURE — 96361 HYDRATE IV INFUSION ADD-ON: CPT | Mod: ER

## 2020-08-24 RX ORDER — METHYLPREDNISOLONE SOD SUCC 125 MG
125 VIAL (EA) INJECTION
Status: COMPLETED | OUTPATIENT
Start: 2020-08-24 | End: 2020-08-24

## 2020-08-24 RX ORDER — HYDROMORPHONE HYDROCHLORIDE 2 MG/ML
1 INJECTION, SOLUTION INTRAMUSCULAR; INTRAVENOUS; SUBCUTANEOUS EVERY 6 HOURS PRN
Status: DISCONTINUED | OUTPATIENT
Start: 2020-08-24 | End: 2020-08-25

## 2020-08-24 RX ORDER — SODIUM CHLORIDE 9 MG/ML
1000 INJECTION, SOLUTION INTRAVENOUS CONTINUOUS
Status: DISCONTINUED | OUTPATIENT
Start: 2020-08-24 | End: 2020-08-25

## 2020-08-24 RX ORDER — ONDANSETRON 2 MG/ML
8 INJECTION INTRAMUSCULAR; INTRAVENOUS
Status: COMPLETED | OUTPATIENT
Start: 2020-08-24 | End: 2020-08-24

## 2020-08-24 RX ORDER — DIPHENHYDRAMINE HYDROCHLORIDE 50 MG/ML
50 INJECTION INTRAMUSCULAR; INTRAVENOUS
Status: COMPLETED | OUTPATIENT
Start: 2020-08-24 | End: 2020-08-24

## 2020-08-24 RX ORDER — OXYCODONE AND ACETAMINOPHEN 10; 325 MG/1; MG/1
1 TABLET ORAL EVERY 4 HOURS PRN
Status: DISCONTINUED | OUTPATIENT
Start: 2020-08-24 | End: 2020-08-26 | Stop reason: HOSPADM

## 2020-08-24 RX ORDER — FAMOTIDINE 10 MG/ML
40 INJECTION INTRAVENOUS
Status: COMPLETED | OUTPATIENT
Start: 2020-08-24 | End: 2020-08-24

## 2020-08-24 RX ORDER — HYDROMORPHONE HYDROCHLORIDE 2 MG/ML
1 INJECTION, SOLUTION INTRAMUSCULAR; INTRAVENOUS; SUBCUTANEOUS
Status: COMPLETED | OUTPATIENT
Start: 2020-08-24 | End: 2020-08-24

## 2020-08-24 RX ORDER — OXYCODONE AND ACETAMINOPHEN 5; 325 MG/1; MG/1
1 TABLET ORAL EVERY 4 HOURS PRN
Status: DISCONTINUED | OUTPATIENT
Start: 2020-08-24 | End: 2020-08-26 | Stop reason: HOSPADM

## 2020-08-24 RX ORDER — ONDANSETRON 2 MG/ML
4 INJECTION INTRAMUSCULAR; INTRAVENOUS EVERY 6 HOURS PRN
Status: DISCONTINUED | OUTPATIENT
Start: 2020-08-24 | End: 2020-08-26 | Stop reason: HOSPADM

## 2020-08-24 RX ADMIN — DIPHENHYDRAMINE HYDROCHLORIDE 50 MG: 50 INJECTION INTRAMUSCULAR; INTRAVENOUS at 10:08

## 2020-08-24 RX ADMIN — OXYCODONE HYDROCHLORIDE AND ACETAMINOPHEN 1 TABLET: 5; 325 TABLET ORAL at 06:08

## 2020-08-24 RX ADMIN — METHYLPREDNISOLONE SODIUM SUCCINATE 125 MG: 125 INJECTION, POWDER, FOR SOLUTION INTRAMUSCULAR; INTRAVENOUS at 10:08

## 2020-08-24 RX ADMIN — OXYCODONE HYDROCHLORIDE AND ACETAMINOPHEN 1 TABLET: 10; 325 TABLET ORAL at 10:08

## 2020-08-24 RX ADMIN — IOHEXOL 100 ML: 350 INJECTION, SOLUTION INTRAVENOUS at 10:08

## 2020-08-24 RX ADMIN — ONDANSETRON 8 MG: 2 INJECTION INTRAMUSCULAR; INTRAVENOUS at 09:08

## 2020-08-24 RX ADMIN — SODIUM CHLORIDE 2040 ML: 0.9 INJECTION, SOLUTION INTRAVENOUS at 09:08

## 2020-08-24 RX ADMIN — HYDROMORPHONE HYDROCHLORIDE 1 MG: 2 INJECTION, SOLUTION INTRAMUSCULAR; INTRAVENOUS; SUBCUTANEOUS at 11:08

## 2020-08-24 RX ADMIN — PIPERACILLIN SODIUM AND TAZOBACTAM SODIUM 4.5 G: 4; .5 INJECTION, POWDER, LYOPHILIZED, FOR SOLUTION INTRAVENOUS at 11:08

## 2020-08-24 RX ADMIN — SODIUM CHLORIDE 1000 ML: 0.9 INJECTION, SOLUTION INTRAVENOUS at 02:08

## 2020-08-24 RX ADMIN — FAMOTIDINE 40 MG: 10 INJECTION INTRAVENOUS at 09:08

## 2020-08-24 RX ADMIN — PIPERACILLIN SODIUM AND TAZOBACTAM SODIUM 4.5 G: 4; .5 INJECTION, POWDER, LYOPHILIZED, FOR SOLUTION INTRAVENOUS at 06:08

## 2020-08-24 NOTE — H&P
[facility]         General Surgery History and Physical    08/24/2020  2:27 PM    Asad Bonner  [id number]    SUBJECTIVE:                                                                                         HPI: 26 yo M, presented to [place] ED with abdominal pain, nausea/vomiting. Pain started late last night, followed my multiple bouts of emesis this morning. No fevers, chills. Pain started mid abdomen and is now located more in the RLQ. Reports recent constipation, tried laxatives and suppository without improvement in symptoms.    CT at [place] ER concerning for early acute appendicitis      ROS - Negative except as per HPI    Objective:                                                                                             Past Medical History:   Diagnosis Date    Asthma        History reviewed. No pertinent surgical history.    History reviewed. No pertinent family history.    Social History     Socioeconomic History    Marital status: Single     Spouse name: Not on file    Number of children: Not on file    Years of education: Not on file    Highest education level: Not on file   Occupational History    Not on file   Social Needs    Financial resource strain: Not on file    Food insecurity     Worry: Not on file     Inability: Not on file    Transportation needs     Medical: Not on file     Non-medical: Not on file   Tobacco Use    Smoking status: Current Every Day Smoker     Types: Cigars    Smokeless tobacco: Never Used   Substance and Sexual Activity    Alcohol use: Yes     Alcohol/week: 1.0 standard drinks     Types: 1 Cans of beer per week    Drug use: Yes     Types: Marijuana    Sexual activity: Yes     Partners: Female   Lifestyle    Physical activity     Days per week: Not on file     Minutes per session: Not on file    Stress: Not on file   Relationships    Social connections     Talks on phone: Not on file     Gets together: Not on file     Attends Anabaptist  service: Not on file     Active member of club or organization: Not on file     Attends meetings of clubs or organizations: Not on file     Relationship status: Not on file   Other Topics Concern    Not on file   Social History Narrative    Not on file       Vitals:    08/24/20 1351   BP: 116/79   Pulse: 69   Resp: 16   Temp: 98.7 °F (37.1 °C)       No results for input(s): WBC, HGB, HCT, PLT in the last 72 hours.     No results for input(s): NA, K, CL, CO2, BUN, CREATININE, GLU in the last 72 hours.     Imaging Results          X-Ray Chest 1 View (Final result)  Result time 08/24/20 11:29:07    Final result by Jeyson Javier MD (08/24/20 11:29:07)                 Impression:      No acute abnormality.      Electronically signed by: Jeyson Javier MD  Date:    08/24/2020  Time:    11:29             Narrative:    EXAMINATION:  XR CHEST 1 VIEW    CLINICAL HISTORY:  Encounter for preprocedural cardiovascular examination    TECHNIQUE:  Single frontal view of the chest was performed.    COMPARISON:  None    FINDINGS:  The lungs are clear, with normal appearance of pulmonary vasculature and no pleural effusion or pneumothorax.    The cardiac silhouette is normal in size. The hilar and mediastinal contours are unremarkable.    Bones are intact.                                CT Abdomen Pelvis With Contrast (Final result)  Result time 08/24/20 10:47:51    Final result by Jan Prince MD (08/24/20 10:47:51)                 Impression:      Findings suspicious for mild or early acute appendicitis.  No abscess or evidence of rupture.    This report was flagged in Epic as abnormal.      Electronically signed by: Jan Prince MD  Date:    08/24/2020  Time:    10:47             Narrative:    EXAMINATION:  CT ABDOMEN PELVIS WITH CONTRAST    CLINICAL HISTORY:  RLQ abdominal pain, appendicitis suspected (Age => 15y);    TECHNIQUE:  Low dose axial images, sagittal and coronal reformations were obtained from the lung  bases to the pubic symphysis following the IV administration of 75 mL of Omnipaque 350 .  Oral contrast was not given.    COMPARISON:  None.    FINDINGS:  Visible lower chest is unremarkable.    The liver, gallbladder, spleen, pancreas, and adrenals are unremarkable.    In the right hemipelvis, the appendix is opacified and mildly dilated up to 0.7 cm in diameter.  Associated mild submucosal enhancement.  No periappendiceal inflammatory change.    Otherwise, no bowel wall thickening or dilation.  No pneumoperitoneum or pneumatosis.  No ascites or focal fluid collection.    The kidneys and ureters are unremarkable.  The urinary bladder is unremarkable.    No adenopathy.    Body wall soft tissues are unremarkable.    No acute bone abnormality.                                PHYSICAL EXAM:  Gen: awake, resting comfortably in bed  HEENT: NCAT, anicteric  Resp: Normal WOB, symmetric expansion  CV: RR, no murmur  Abd: Soft, mildly tender in RLQ, no rebound or guarding  Ext: WWP, no edema  Skin: warm, dry, intact  Neuro: alert, normal speech  Psych: Normal mood, normal affect      ASSESSMENT / PLAN:                                                                           25 y.o. male with probable early, acute appendicitis    -to OR tomorrow for lap appy  -zosyn  -CLD now, NPO at midnight  -prn pain and nausea control  -IVF      Cindy Mckenzie MD  [facility] General Surgery, PGY-V

## 2020-08-24 NOTE — ED PROVIDER NOTES
Encounter Date: 8/24/2020       History     Chief Complaint   Patient presents with    Abdominal Pain     LOWER ABD PAIN AND VOMITTING X 0300 TODAY; ALSO REPORTS CONSTIPATION X 3 DAYS; DENIES FEVER      25 y.o. male Past Medical History:  No date: Asthma     Endorses vague abd pain starting last night at 3 am with approx 5-6 bouts of nbnb emesis since, notes vague epigastric pain and lower abd pain worse in RLQ. Notes no BM in 3 days. Last passed gas last night. No f/c, diarrhea/dysuria or other complaints.  Pt states he tried a laxative and suppository without improvement.        Review of patient's allergies indicates:   Allergen Reactions    Shellfish containing products      Past Medical History:   Diagnosis Date    Asthma      History reviewed. No pertinent surgical history.  History reviewed. No pertinent family history.  Social History     Tobacco Use    Smoking status: Current Every Day Smoker     Types: Cigars    Smokeless tobacco: Never Used   Substance Use Topics    Alcohol use: No    Drug use: Yes     Types: Marijuana     Review of Systems   Constitutional: Negative for fever.   HENT: Negative for sore throat.    Respiratory: Negative for shortness of breath.    Cardiovascular: Negative for chest pain.   Gastrointestinal: Positive for constipation, nausea and vomiting.   Genitourinary: Negative for dysuria.   Musculoskeletal: Negative for back pain.   Skin: Negative for rash.   Neurological: Negative for weakness.   Hematological: Does not bruise/bleed easily.   All other systems reviewed and are negative.      Physical Exam     Initial Vitals [08/24/20 0911]   BP Pulse Resp Temp SpO2   113/66 102 18 98.2 °F (36.8 °C) 100 %      MAP       --         Physical Exam    Nursing note and vitals reviewed.  Constitutional: He appears well-developed and well-nourished.   HENT:   Head: Normocephalic and atraumatic.   Eyes: EOM are normal. Pupils are equal, round, and reactive to light.   Cardiovascular:  Normal rate and regular rhythm.   Pulmonary/Chest: Effort normal.   Abdominal: Soft. He exhibits no distension.   Musculoskeletal: No tenderness or edema.   Neurological: He is alert and oriented to person, place, and time. No cranial nerve deficit.   Skin: Skin is warm and dry.   Psychiatric: He has a normal mood and affect.       +RLQ point ttp at mcburney's point, no g/r  ED Course   Procedures  Labs Reviewed   CULTURE, BLOOD   CULTURE, BLOOD   CULTURE, URINE   POCT CBC   POCT URINALYSIS DIPSTICK (CULTURE IF INDICATED)   POCT CMP          Imaging Results    None                       Attending Attestation:         Attending Critical Care:   Critical Care Times:   Direct Patient Care (initial evaluation, reassessments, and time considering the case)................................................................30 minutes.   Additional History from reviewing old medical records or taking additional history from the family, EMS, PCP, etc.......................10 minutes.   Ordering, Reviewing, and Interpreting Diagnostic Studies...............................................................................................................10 minutes.   Documentation..................................................................................................................................................................................10 minutes.   Consultation with other Physicians. .................................................................................................................................................10 minutes.   ==============================================================  · Total Critical Care Time - exclusive of procedural time: 70 minutes.  ==============================================================            Labs Reviewed   POCT URINALYSIS W/O SCOPE - Abnormal; Notable for the following components:       Result Value    Bilirubin, UA 1+ (*)     Spec Grav UA >=1.030 (*)      All other components within normal limits   ISTAT PROCEDURE - Abnormal; Notable for the following components:    POC PCO2 34.3 (*)     POC PO2 29 (*)     POC HCO3 22.5 (*)     POC SATURATED O2 58 (*)     POC Lactate 4.07 (*)     All other components within normal limits   CULTURE, BLOOD   CULTURE, BLOOD   CULTURE, URINE   POCT CBC   POCT URINALYSIS DIPSTICK (CULTURE IF INDICATED)   POCT CMP   POCT LIVER PANEL   POCT LIVER PANEL   POCT CMP       CT Abdomen Pelvis With Contrast   Final Result   Abnormal      Findings suspicious for mild or early acute appendicitis.  No abscess or evidence of rupture.      This report was flagged in Epic as abnormal.         Electronically signed by: Jan Prince MD   Date:    08/24/2020   Time:    10:47      X-Ray Chest 1 View    (Results Pending)                          Clinical Impression:       ICD-10-CM ICD-9-CM   1. Appendicitis, unspecified appendicitis type  K37 541   2. Abdominal pain  R10.9 789.00   3. Preop cardiovascular exam  Z01.810 V72.81   4. Appendicitis  K37 541                                Chadwick Gallardo MD  08/24/20 1127

## 2020-08-24 NOTE — ANESTHESIA PREPROCEDURE EVALUATION
08/24/2020  Asad Bonner is a 25 y.o., male.  To undergo Procedure(s) (LRB):  APPENDECTOMY, LAPAROSCOPIC (N/A)     Denies CP/SOB/MI/CVA/URI symptoms.  Endorses vomiting this morning (8/24/20)  Occasional GERD with spicy foods.  METS > 4  NPO > 8 instructions given  Paper consent placed in chart.    Past Medical History:  Past Medical History:   Diagnosis Date    Asthma        Past Surgical History:  History reviewed. No pertinent surgical history.    Social History:  Social History     Socioeconomic History    Marital status: Single     Spouse name: Not on file    Number of children: Not on file    Years of education: Not on file    Highest education level: Not on file   Occupational History    Not on file   Social Needs    Financial resource strain: Not on file    Food insecurity     Worry: Not on file     Inability: Not on file    Transportation needs     Medical: Not on file     Non-medical: Not on file   Tobacco Use    Smoking status: Current Every Day Smoker     Types: Cigars    Smokeless tobacco: Never Used   Substance and Sexual Activity    Alcohol use: Yes     Alcohol/week: 1.0 standard drinks     Types: 1 Cans of beer per week    Drug use: Yes     Types: Marijuana    Sexual activity: Yes     Partners: Female   Lifestyle    Physical activity     Days per week: Not on file     Minutes per session: Not on file    Stress: Not on file   Relationships    Social connections     Talks on phone: Not on file     Gets together: Not on file     Attends Spiritism service: Not on file     Active member of club or organization: Not on file     Attends meetings of clubs or organizations: Not on file     Relationship status: Not on file   Other Topics Concern    Not on file   Social History Narrative    Not on file       Medications:  No current facility-administered medications on file prior to  encounter.      Current Outpatient Medications on File Prior to Encounter   Medication Sig Dispense Refill    EPINEPHrine (EPIPEN) 0.3 mg/0.3 mL AtIn Inject 0.3 mLs (0.3 mg total) into the muscle as needed. 2 each 0    ondansetron (ZOFRAN-ODT) 4 MG TbDL Take 1 tablet (4 mg total) by mouth every 6 (six) hours as needed (nausea). 20 tablet 0    pantoprazole (PROTONIX) 20 MG tablet Take 1 tablet (20 mg total) by mouth once daily. 30 tablet 0       Allergies:  Review of patient's allergies indicates:   Allergen Reactions    Shellfish containing products        Active Problems:  Patient Active Problem List   Diagnosis    Allergic reaction to food    Appendicitis       Diagnostic Studies:    CBC:  No results for input(s): WBC, RBC, HGB, HCT, PLT, MCV, MCH, MCHC in the last 24 hours.     CMP:  No results for input(s): GLUCOSE, CALCIUM, PROT, NA, K, CO2, CL, BUN, CREATININE, ALKPHOS, ALT, AST, BILITOT in the last 24 hours.    Invalid input(s):  ALBUMIN    PT/INR/PTT:  No results for input(s): PT, INR, APTT in the last 24 hours.    Results for YVES LEA (MRN [id number]) as of 8/24/2020 16:53   Ref. Range 8/24/2020 11:15   SARS-CoV-2 RNA, Amplification, Qual Latest Ref Range: Negative  Negative     EKG (8/24/20):  Normal sinus rhythm with sinus arrhythmia   Normal ECG    CXR (8/24/20):  No acute abnormality.    24 Hour Vitals:  Temp:  [36.7 °C (98.1 °F)-37.1 °C (98.7 °F)] 36.7 °C (98.1 °F)  Pulse:  [] 71  Resp:  [16-20] 18  SpO2:  [99 %-100 %] 100 %  BP: (104-140)/(61-79) 117/61   See Nursing Charting For Additional Vitals    Anesthesia Evaluation    I have reviewed the Patient Summary Reports.    I have reviewed the Nursing Notes.       Review of Systems  Anesthesia Hx:  No previous Anesthesia  Denies Family Hx of Anesthesia complications.   Denies Personal Hx of Anesthesia complications.   Social:  Social Alcohol Use, Smoker    Cardiovascular:  Cardiovascular Normal Exercise tolerance: good  Denies Dysrhythmias.    Denies Angina. ECG has been reviewed.    Pulmonary:   Asthma mild Endorses albuterol use 1x a month and hospitalized once a few years ago with pneumonia; currently feels at respiratory baseline   Renal/:  Renal/ Normal     Hepatic/GI:   Acute appendicitis   Neurological:  Neurology Normal    Endocrine:  Endocrine Normal        Physical Exam  General:  Well nourished    Airway/Jaw/Neck:   MP1, TMD > 3FB, chipped top right incisor    Dental:  Dental Findings:    Chest/Lungs:  Chest/Lungs Clear    Heart/Vascular:  Heart Findings: Normal            Anesthesia Plan  Type of Anesthesia, risks & benefits discussed:  Anesthesia Type:  general  Patient's Preference:   Intra-op Monitoring Plan: standard ASA monitors  Intra-op Monitoring Plan Comments:   Post Op Pain Control Plan: multimodal analgesia, IV/PO Opioids PRN and per primary service following discharge from PACU  Post Op Pain Control Plan Comments:   Induction:   IV and rapid sequence  Beta Blocker:  Patient is not currently on a Beta-Blocker (No further documentation required).       Informed Consent: Patient understands risks and agrees with Anesthesia plan.  Questions answered. Anesthesia consent signed with patient.  ASA Score: 2     Day of Surgery Review of History & Physical: I have interviewed and examined the patient. I have reviewed the patient's H&P dated:            Ready For Surgery From Anesthesia Perspective.

## 2020-08-24 NOTE — NURSING
Patient arrived to unit from [facility] via stretcher, accompanied by [facility].  Patient oriented to room and call light system. Bed in low position, wheels locked, alarms on and audible.  Potty, position, and pain needs addressed.  No acute distress noted;  Will continue to monitor.

## 2020-08-25 ENCOUNTER — ANESTHESIA (OUTPATIENT)
Dept: SURGERY | Facility: HOSPITAL | Age: 25
End: 2020-08-25
Payer: COMMERCIAL

## 2020-08-25 PROCEDURE — S0020 INJECTION, BUPIVICAINE HYDRO: HCPCS | Performed by: SURGERY

## 2020-08-25 PROCEDURE — 37000009 HC ANESTHESIA EA ADD 15 MINS: Performed by: SURGERY

## 2020-08-25 PROCEDURE — 63600175 PHARM REV CODE 636 W HCPCS: Performed by: ANESTHESIOLOGY

## 2020-08-25 PROCEDURE — 88304 TISSUE EXAM BY PATHOLOGIST: CPT | Mod: 26,,, | Performed by: PATHOLOGY

## 2020-08-25 PROCEDURE — 88304 TISSUE EXAM BY PATHOLOGIST: CPT | Performed by: PATHOLOGY

## 2020-08-25 PROCEDURE — 44970 LAPAROSCOPY APPENDECTOMY: CPT | Mod: ,,, | Performed by: SURGERY

## 2020-08-25 PROCEDURE — G0378 HOSPITAL OBSERVATION PER HR: HCPCS

## 2020-08-25 PROCEDURE — 37000008 HC ANESTHESIA 1ST 15 MINUTES: Performed by: SURGERY

## 2020-08-25 PROCEDURE — 25000003 PHARM REV CODE 250: Performed by: EMERGENCY MEDICINE

## 2020-08-25 PROCEDURE — 25000003 PHARM REV CODE 250: Performed by: STUDENT IN AN ORGANIZED HEALTH CARE EDUCATION/TRAINING PROGRAM

## 2020-08-25 PROCEDURE — 71000033 HC RECOVERY, INTIAL HOUR: Performed by: SURGERY

## 2020-08-25 PROCEDURE — 71000039 HC RECOVERY, EACH ADD'L HOUR: Performed by: SURGERY

## 2020-08-25 PROCEDURE — 63600175 PHARM REV CODE 636 W HCPCS: Performed by: NURSE ANESTHETIST, CERTIFIED REGISTERED

## 2020-08-25 PROCEDURE — 25000003 PHARM REV CODE 250: Performed by: NURSE ANESTHETIST, CERTIFIED REGISTERED

## 2020-08-25 PROCEDURE — 63600175 PHARM REV CODE 636 W HCPCS: Performed by: STUDENT IN AN ORGANIZED HEALTH CARE EDUCATION/TRAINING PROGRAM

## 2020-08-25 PROCEDURE — 96376 TX/PRO/DX INJ SAME DRUG ADON: CPT

## 2020-08-25 PROCEDURE — D9220A PRA ANESTHESIA: ICD-10-PCS | Mod: CRNA,,, | Performed by: NURSE ANESTHETIST, CERTIFIED REGISTERED

## 2020-08-25 PROCEDURE — 25000003 PHARM REV CODE 250: Performed by: SURGERY

## 2020-08-25 PROCEDURE — 44970 PR LAP,APPENDECTOMY: ICD-10-PCS | Mod: ,,, | Performed by: SURGERY

## 2020-08-25 PROCEDURE — D9220A PRA ANESTHESIA: Mod: CRNA,,, | Performed by: NURSE ANESTHETIST, CERTIFIED REGISTERED

## 2020-08-25 PROCEDURE — 36000709 HC OR TIME LEV III EA ADD 15 MIN: Performed by: SURGERY

## 2020-08-25 PROCEDURE — 63600175 PHARM REV CODE 636 W HCPCS: Performed by: EMERGENCY MEDICINE

## 2020-08-25 PROCEDURE — 36000708 HC OR TIME LEV III 1ST 15 MIN: Performed by: SURGERY

## 2020-08-25 PROCEDURE — D9220A PRA ANESTHESIA: ICD-10-PCS | Mod: ANES,,, | Performed by: ANESTHESIOLOGY

## 2020-08-25 PROCEDURE — 88304 PR  SURG PATH,LEVEL III: ICD-10-PCS | Mod: 26,,, | Performed by: PATHOLOGY

## 2020-08-25 PROCEDURE — 63600175 PHARM REV CODE 636 W HCPCS: Performed by: SURGERY

## 2020-08-25 PROCEDURE — D9220A PRA ANESTHESIA: Mod: ANES,,, | Performed by: ANESTHESIOLOGY

## 2020-08-25 PROCEDURE — 27201423 OPTIME MED/SURG SUP & DEVICES STERILE SUPPLY: Performed by: SURGERY

## 2020-08-25 PROCEDURE — 96374 THER/PROPH/DIAG INJ IV PUSH: CPT | Mod: 59

## 2020-08-25 PROCEDURE — 96361 HYDRATE IV INFUSION ADD-ON: CPT

## 2020-08-25 RX ORDER — FENTANYL CITRATE 50 UG/ML
INJECTION, SOLUTION INTRAMUSCULAR; INTRAVENOUS
Status: DISCONTINUED | OUTPATIENT
Start: 2020-08-25 | End: 2020-08-25

## 2020-08-25 RX ORDER — KETOROLAC TROMETHAMINE 30 MG/ML
30 INJECTION, SOLUTION INTRAMUSCULAR; INTRAVENOUS EVERY 6 HOURS
Status: DISCONTINUED | OUTPATIENT
Start: 2020-08-25 | End: 2020-08-26 | Stop reason: HOSPADM

## 2020-08-25 RX ORDER — PROPOFOL 10 MG/ML
VIAL (ML) INTRAVENOUS
Status: DISCONTINUED | OUTPATIENT
Start: 2020-08-25 | End: 2020-08-25

## 2020-08-25 RX ORDER — FENTANYL CITRATE 50 UG/ML
25 INJECTION, SOLUTION INTRAMUSCULAR; INTRAVENOUS EVERY 5 MIN PRN
Status: COMPLETED | OUTPATIENT
Start: 2020-08-25 | End: 2020-08-25

## 2020-08-25 RX ORDER — ONDANSETRON 2 MG/ML
4 INJECTION INTRAMUSCULAR; INTRAVENOUS DAILY PRN
Status: DISCONTINUED | OUTPATIENT
Start: 2020-08-25 | End: 2020-08-25 | Stop reason: HOSPADM

## 2020-08-25 RX ORDER — NEOSTIGMINE METHYLSULFATE 1 MG/ML
INJECTION, SOLUTION INTRAVENOUS
Status: DISCONTINUED | OUTPATIENT
Start: 2020-08-25 | End: 2020-08-25

## 2020-08-25 RX ORDER — MIDAZOLAM HYDROCHLORIDE 1 MG/ML
INJECTION, SOLUTION INTRAMUSCULAR; INTRAVENOUS
Status: DISCONTINUED | OUTPATIENT
Start: 2020-08-25 | End: 2020-08-25

## 2020-08-25 RX ORDER — ACETAMINOPHEN 10 MG/ML
1000 INJECTION, SOLUTION INTRAVENOUS ONCE
Status: COMPLETED | OUTPATIENT
Start: 2020-08-25 | End: 2020-08-25

## 2020-08-25 RX ORDER — HYDROMORPHONE HYDROCHLORIDE 2 MG/ML
0.2 INJECTION, SOLUTION INTRAMUSCULAR; INTRAVENOUS; SUBCUTANEOUS EVERY 5 MIN PRN
Status: COMPLETED | OUTPATIENT
Start: 2020-08-25 | End: 2020-08-25

## 2020-08-25 RX ORDER — GLYCOPYRROLATE 0.2 MG/ML
INJECTION INTRAMUSCULAR; INTRAVENOUS
Status: DISCONTINUED | OUTPATIENT
Start: 2020-08-25 | End: 2020-08-25

## 2020-08-25 RX ORDER — SODIUM CHLORIDE 0.9 % (FLUSH) 0.9 %
3 SYRINGE (ML) INJECTION
Status: DISCONTINUED | OUTPATIENT
Start: 2020-08-25 | End: 2020-08-25 | Stop reason: HOSPADM

## 2020-08-25 RX ORDER — LIDOCAINE HCL/PF 100 MG/5ML
SYRINGE (ML) INTRAVENOUS
Status: DISCONTINUED | OUTPATIENT
Start: 2020-08-25 | End: 2020-08-25

## 2020-08-25 RX ORDER — KETOROLAC TROMETHAMINE 30 MG/ML
15 INJECTION, SOLUTION INTRAMUSCULAR; INTRAVENOUS EVERY 6 HOURS
Status: DISCONTINUED | OUTPATIENT
Start: 2020-08-25 | End: 2020-08-25

## 2020-08-25 RX ORDER — BUPIVACAINE HYDROCHLORIDE 2.5 MG/ML
INJECTION, SOLUTION INFILTRATION; PERINEURAL
Status: DISCONTINUED | OUTPATIENT
Start: 2020-08-25 | End: 2020-08-25 | Stop reason: HOSPADM

## 2020-08-25 RX ORDER — OXYCODONE AND ACETAMINOPHEN 5; 325 MG/1; MG/1
1 TABLET ORAL EVERY 4 HOURS PRN
Qty: 30 TABLET | Refills: 0 | Status: SHIPPED | OUTPATIENT
Start: 2020-08-25

## 2020-08-25 RX ORDER — ROCURONIUM BROMIDE 10 MG/ML
INJECTION, SOLUTION INTRAVENOUS
Status: DISCONTINUED | OUTPATIENT
Start: 2020-08-25 | End: 2020-08-25

## 2020-08-25 RX ORDER — HYDROMORPHONE HYDROCHLORIDE 2 MG/ML
0.2 INJECTION, SOLUTION INTRAMUSCULAR; INTRAVENOUS; SUBCUTANEOUS EVERY 5 MIN PRN
Status: DISCONTINUED | OUTPATIENT
Start: 2020-08-25 | End: 2020-08-25 | Stop reason: HOSPADM

## 2020-08-25 RX ORDER — SODIUM CHLORIDE 0.9 % (FLUSH) 0.9 %
10 SYRINGE (ML) INJECTION
Status: DISCONTINUED | OUTPATIENT
Start: 2020-08-25 | End: 2020-08-25 | Stop reason: HOSPADM

## 2020-08-25 RX ORDER — HYDROMORPHONE HYDROCHLORIDE 2 MG/ML
1 INJECTION, SOLUTION INTRAMUSCULAR; INTRAVENOUS; SUBCUTANEOUS EVERY 4 HOURS PRN
Status: DISCONTINUED | OUTPATIENT
Start: 2020-08-25 | End: 2020-08-26 | Stop reason: HOSPADM

## 2020-08-25 RX ADMIN — HYDROMORPHONE HYDROCHLORIDE 0.2 MG: 2 INJECTION, SOLUTION INTRAMUSCULAR; INTRAVENOUS; SUBCUTANEOUS at 01:08

## 2020-08-25 RX ADMIN — PROPOFOL 160 MG: 10 INJECTION, EMULSION INTRAVENOUS at 11:08

## 2020-08-25 RX ADMIN — FENTANYL CITRATE 25 MCG: 50 INJECTION INTRAMUSCULAR; INTRAVENOUS at 01:08

## 2020-08-25 RX ADMIN — FENTANYL CITRATE 100 MCG: 50 INJECTION INTRAMUSCULAR; INTRAVENOUS at 11:08

## 2020-08-25 RX ADMIN — FENTANYL CITRATE 25 MCG: 50 INJECTION INTRAMUSCULAR; INTRAVENOUS at 02:08

## 2020-08-25 RX ADMIN — HYDROMORPHONE HYDROCHLORIDE 1 MG: 2 INJECTION, SOLUTION INTRAMUSCULAR; INTRAVENOUS; SUBCUTANEOUS at 02:08

## 2020-08-25 RX ADMIN — OXYCODONE HYDROCHLORIDE AND ACETAMINOPHEN 1 TABLET: 5; 325 TABLET ORAL at 10:08

## 2020-08-25 RX ADMIN — PIPERACILLIN SODIUM AND TAZOBACTAM SODIUM 4.5 G: 4; .5 INJECTION, POWDER, LYOPHILIZED, FOR SOLUTION INTRAVENOUS at 10:08

## 2020-08-25 RX ADMIN — KETOROLAC TROMETHAMINE 30 MG: 30 INJECTION, SOLUTION INTRAMUSCULAR at 05:08

## 2020-08-25 RX ADMIN — NEOSTIGMINE METHYLSULFATE 4 MG: 1 INJECTION INTRAVENOUS at 12:08

## 2020-08-25 RX ADMIN — ACETAMINOPHEN 1000 MG: 10 INJECTION, SOLUTION INTRAVENOUS at 02:08

## 2020-08-25 RX ADMIN — MIDAZOLAM HYDROCHLORIDE 2 MG: 1 INJECTION, SOLUTION INTRAMUSCULAR; INTRAVENOUS at 11:08

## 2020-08-25 RX ADMIN — ONDANSETRON 4 MG: 2 INJECTION, SOLUTION INTRAMUSCULAR; INTRAVENOUS at 12:08

## 2020-08-25 RX ADMIN — LIDOCAINE HYDROCHLORIDE 80 MG: 20 INJECTION, SOLUTION INTRAVENOUS at 11:08

## 2020-08-25 RX ADMIN — SODIUM CHLORIDE 1000 ML: 0.9 INJECTION, SOLUTION INTRAVENOUS at 01:08

## 2020-08-25 RX ADMIN — GLYCOPYRROLATE 0.6 MG: 0.2 INJECTION, SOLUTION INTRAMUSCULAR; INTRAVENOUS at 12:08

## 2020-08-25 RX ADMIN — PIPERACILLIN SODIUM AND TAZOBACTAM SODIUM 4.5 G: 4; .5 INJECTION, POWDER, LYOPHILIZED, FOR SOLUTION INTRAVENOUS at 03:08

## 2020-08-25 RX ADMIN — ROCURONIUM BROMIDE 40 MG: 10 INJECTION, SOLUTION INTRAVENOUS at 11:08

## 2020-08-25 NOTE — PLAN OF CARE
Patient alert and oriented x4. Respirations even and unlabored. No distress noted. Skin warm and dry. Iv fluids infusing with no complications noted. Iv antibiotics given as ordered. Patient complains of pain to abdomen. Pain medications given as ordered.  Patient npo for surgery this am. Patient has no complaints at this time. Instructed to call for any needs. Bed in lowest position. Call bell within reach. Will continue to monitor.   Problem: Adult Inpatient Plan of Care  Goal: Plan of Care Review  Outcome: Ongoing, Progressing  Flowsheets (Taken 8/25/2020 0541)  Plan of Care Reviewed With: patient  Goal: Patient-Specific Goal (Individualization)  Outcome: Ongoing, Progressing  Goal: Absence of Hospital-Acquired Illness or Injury  Outcome: Ongoing, Progressing     Problem: Pain Acute  Goal: Optimal Pain Control  Outcome: Ongoing, Progressing  Intervention: Develop Pain Management Plan  Flowsheets (Taken 8/25/2020 0541)  Pain Management Interventions:   care clustered   pain management plan reviewed with patient/caregiver   pillow support provided  Intervention: Prevent or Manage Pain  Flowsheets (Taken 8/25/2020 0541)  Sensory Stimulation Regulation:   quiet environment promoted   care clustered     Problem: Infection  Goal: Infection Symptom Resolution  Outcome: Ongoing, Progressing

## 2020-08-25 NOTE — PLAN OF CARE
Problem: Adult Inpatient Plan of Care  Goal: Plan of Care Review  Outcome: Ongoing, Progressing  Goal: Patient-Specific Goal (Individualization)  Outcome: Ongoing, Progressing  Goal: Absence of Hospital-Acquired Illness or Injury  Outcome: Ongoing, Progressing  Goal: Optimal Comfort and Wellbeing  Outcome: Ongoing, Progressing  Goal: Readiness for Transition of Care  Outcome: Ongoing, Progressing  Goal: Rounds/Family Conference  Outcome: Ongoing, Progressing     Problem: Pain Acute  Goal: Optimal Pain Control  Outcome: Ongoing, Progressing     Problem: Infection  Goal: Infection Symptom Resolution  Outcome: Ongoing, Progressing     Problem: Bleeding (Appendectomy)  Goal: Absence of Bleeding  Outcome: Ongoing, Progressing     Problem: Bowel Elimination Impaired (Appendectomy)  Goal: Effective Bowel Elimination  Outcome: Ongoing, Progressing     Problem: Ongoing Anesthesia Effects (Appendectomy)  Goal: Anesthesia/Sedation Recovery  Outcome: Ongoing, Progressing     Problem: Postoperative Nausea and Vomiting (Appendectomy)  Goal: Nausea and Vomiting Relief  Outcome: Ongoing, Progressing     Problem: Postoperative Urinary Retention (Appendectomy)  Goal: Effective Urinary Elimination  Outcome: Ongoing, Progressing      attending mdm: 3.4 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 one port started to leak after flushing with heparin last night. due for pentamidine in 2 days. no fever. no URI sxs. no v/d. nl PO. nl UOP. recently was in the ER for fever and received abx. attending mdm: 3.6 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 one port started to leak after flushing with heparin last night. due for pentamidine in 2 days. no fever. no URI sxs. no v/d. nl PO. nl UOP. recently was in the ER for fever and received abx. on exam pt well appearing. exam notable for crack in distal aspect of one lumen of broviac. no erythema around broviac. lungs clear, s1s2 no murmurs, abd soft ntnd. A/P plan for repair with IR vs surgery. will page both. Cam Ha MD Attending

## 2020-08-25 NOTE — ANESTHESIA PROCEDURE NOTES
Intubation  Performed by: Santos Astorga CRNA  Authorized by: Gio Merida MD     Intubation:     Induction:  Intravenous    Intubated:  Postinduction    Attempts:  1    Attempted By:  Student (ANTOINE HERNANDEZ)    Blade:  Sheela 3    Laryngeal View Grade: Grade I - full view of chords      Difficult Airway Encountered?: No      Complications:  None    Airway Device:  Oral endotracheal tube    Airway Device Size:  7.5    Style/Cuff Inflation:  Cuffed    Inflation Amount (mL):  6    Tube secured:  22    Secured at:  The lips    Placement Verified By:  Capnometry    Complicating Factors:  None    Findings Post-Intubation:  BS equal bilateral

## 2020-08-25 NOTE — PLAN OF CARE
"  Problem: Adult Inpatient Plan of Care  Goal: Plan of Care Review  8/25/2020 1841 by Abrahan Sosa RN  Flowsheets (Taken 8/25/2020 1841)  Plan of Care Reviewed With:   patient   mother   significant other  Patient remained free from falls, trauma, and injuries throughout shift. Complaints of pain controlled with scheduled Toradol as opposed to IV analgesics.  IV fluids and IV antibiotics administered as prescribed. Lap sites to abdomen clean, dry, and intact. Educated patient on the importance of ambulating; patient verbalized understanding and stated, "I will walk when my pain is better." Tolerated clear liquid diet. Significant other remains at bedside. No acute distress noted.      "

## 2020-08-25 NOTE — ANESTHESIA PROCEDURE NOTES
Intubation  Performed by: Santos Astorga CRNA  Authorized by: Gio Merida MD     Intubation:     Induction:  Intravenous    Intubated:  Postinduction    Attempts:  1    Attempted By:  Student (MAYTE HERNANDEZ)    Blade:  Sheela 3    Laryngeal View Grade: Grade I - full view of chords      Difficult Airway Encountered?: No      Complications:  None    Airway Device:  Oral endotracheal tube    Airway Device Size:  7.5    Style/Cuff Inflation:  Cuffed    Inflation Amount (mL):  6    Tube secured:  22    Secured at:  The lips    Placement Verified By:  Capnometry    Complicating Factors:  None    Findings Post-Intubation:  BS equal bilateral

## 2020-08-25 NOTE — OP NOTE
[facility]  Operative Note    SUMMARY     Surgery Date: 8/25/2020     Surgeon(s) and Role:     * Calos Juarez MD - Primary     * Cindy Mckenzie MD - Resident - Assisting    Pre-op Diagnosis:  Appendicitis [K37]    Post-op Diagnosis:  Post-Op Diagnosis Codes:     * Appendicitis [K37]    Procedure(s) (LRB):  APPENDECTOMY, LAPAROSCOPIC (N/A)    Anesthesia: General    Indication for procedure: Asad Bonner is 25 y.o. male with no past medical history was admitted with acute appendicitis based on history exam and CT scan. After discussion of disease process, we discussed options and have elected for operation to remove appendix.    Description of Procedure: After consent was obtained, patient was taken to the OR. The abdomen was prepped and draped in a standard sterile fashion after general anesthesia was started. Time out was performed. Antibiotics were started with IV Zosyn.  Prior to surgery Chow was placed. We began the procedure by making a supraumbilical vertical cut down approximately 2 cm incision and we entered the fascia.  We did inject 10 mL of Marcaine with epinephrine prior to this incision.  We entered the peritoneal cavity and inserted our 12 mm balloon trocar.  We achieved pneumoperitoneum which patient tolerated well with no issues with his vital signs.  We then placed 5 mm trocars under direct visualization in the left lower quadrant suprapubic area.  We then put the patient Trendelenburg and rotated right side up.  The appendix was noted to be somewhat adhesed to the anterior abdominal wall peritoneum.  This was taken down with combination of sharp and blunt dissection and it was hemostatic.  We then lifted the appendix up towards the anterior abdominal wall and bluntly created a window at the base of the appendix between the appendix and the mesoappendix.  We used a laparoscopic stapler with a blue load to go across the base the appendix and a white load across the  mesoappendix.  The stump looked hemostatic and intact and the stapler fired well.  We then removed the appendix with the Endo-Catch bag there was no bleeding there was no fluid spillage we deemed that this was early appendicitis with the adhesions of the appendix.   All counts were correct x2. Patient was awakened from anesthesia and taken to the recovery room in a stable condition having suffered no issues at this time.    Description of the findings of the procedure:  Early appendicitis with some adhesions near the tip of the appendix    Estimated Blood Loss: * No values recorded between 8/25/2020 12:06 PM and 8/25/2020 12:43 PM *         Specimens:   Appendix    Drains/Implants: None

## 2020-08-25 NOTE — NURSING
Patient arrived back on unit from surgery. Patient complaining of excruciating pain to abdomen. Lap sites to abdomen clean, dry, and intact. Spoke with Dr. Mckenzie at bedside. Okay to give patient dose of prn Diluadid. Patient's significant other at bedside. Will continue to monitor.

## 2020-08-25 NOTE — TRANSFER OF CARE
"Anesthesia Transfer of Care Note    Patient: Asad Ha    Procedure(s) Performed: Procedure(s) (LRB):  APPENDECTOMY, LAPAROSCOPIC (N/A)    Patient location: PACU    Anesthesia Type: general    Transport from OR: Transported from OR on room air with adequate spontaneous ventilation    Post pain: adequate analgesia    Post assessment: no apparent anesthetic complications and tolerated procedure well    Post vital signs: stable    Level of consciousness: sedated and responds to stimulation    Nausea/Vomiting: no nausea/vomiting    Complications: none    Transfer of care protocol was followed      Last vitals:   Visit Vitals  /70 (BP Location: Right arm, Patient Position: Lying)   Pulse 89   Temp 36.9 °C (98.4 °F) (Oral)   Resp 17   Ht 5' 7" (1.702 m)   Wt 77.6 kg (171 lb 1.2 oz)   SpO2 100%   BMI 26.79 kg/m²     "

## 2020-08-25 NOTE — PROGRESS NOTES
General Surgery Progress Note:     S: NAEO. Still with some abdominal pain    O:   Vitals:    08/24/20 2216 08/24/20 2343 08/25/20 0533 08/25/20 0802   BP:  123/65 (!) 118/57 (!) 103/55   BP Location:  Right arm Right arm Right arm   Patient Position:  Lying Lying Lying   Pulse:  64 61 (!) 55   Resp: 18 18 18 18   Temp:  97.9 °F (36.6 °C) 97.5 °F (36.4 °C) 98 °F (36.7 °C)   TempSrc:  Oral Oral Oral   SpO2:  98% 99% 96%   Weight:       Height:           Gen: awake, resting comfortably in bed  HEENT: NCAT, anicteric  Resp: Normal WOB, symmetric expansion  CV: RR, no murmur  Abd: Soft, mildly tender, no rebound or guarding  Ext: WWP, no edema  Skin: warm, dry, intact  Neuro: alert, normal speech  Psych: Normal mood, normal affect      No results found for: WBC, HGB, HCT, MCV, PLT        CMP  No results found for: NA, K, CL, CO2, GLU, BUN, CREATININE, CALCIUM, PROT, ALBUMIN, BILITOT, ALKPHOS, AST, ALT, ANIONGAP, ESTGFRAFRICA, EGFRNONAA    A/P: 26 yo M with acute appendicitis    To OR today for lap appy  NPO, Ok for reg diet post op  Tayla Mckenzie MD  [facility] General Surgery, PGY-V

## 2020-08-26 VITALS
HEIGHT: 67 IN | BODY MASS INDEX: 26.85 KG/M2 | SYSTOLIC BLOOD PRESSURE: 123 MMHG | HEART RATE: 65 BPM | RESPIRATION RATE: 18 BRPM | OXYGEN SATURATION: 98 % | DIASTOLIC BLOOD PRESSURE: 76 MMHG | TEMPERATURE: 98 F | WEIGHT: 171.06 LBS

## 2020-08-26 LAB — BACTERIA UR CULT: NO GROWTH

## 2020-08-26 PROCEDURE — 25000003 PHARM REV CODE 250: Performed by: STUDENT IN AN ORGANIZED HEALTH CARE EDUCATION/TRAINING PROGRAM

## 2020-08-26 PROCEDURE — 63600175 PHARM REV CODE 636 W HCPCS: Performed by: SURGERY

## 2020-08-26 PROCEDURE — 94761 N-INVAS EAR/PLS OXIMETRY MLT: CPT

## 2020-08-26 PROCEDURE — G0378 HOSPITAL OBSERVATION PER HR: HCPCS

## 2020-08-26 PROCEDURE — 96376 TX/PRO/DX INJ SAME DRUG ADON: CPT

## 2020-08-26 RX ADMIN — KETOROLAC TROMETHAMINE 30 MG: 30 INJECTION, SOLUTION INTRAMUSCULAR at 12:08

## 2020-08-26 RX ADMIN — KETOROLAC TROMETHAMINE 30 MG: 30 INJECTION, SOLUTION INTRAMUSCULAR at 05:08

## 2020-08-26 RX ADMIN — OXYCODONE HYDROCHLORIDE AND ACETAMINOPHEN 1 TABLET: 10; 325 TABLET ORAL at 06:08

## 2020-08-26 RX ADMIN — KETOROLAC TROMETHAMINE 30 MG: 30 INJECTION, SOLUTION INTRAMUSCULAR at 11:08

## 2020-08-26 NOTE — PROGRESS NOTES
"EDUCATION:  Mr Stack (friend) was  provided with educational information on post operative discharge instructions.  Information reviewed and placed in :My Healthcare Packet" to be brought home for patient and family to use as resource after discharge.  Information included:  problems and symptoms to look for and call the doctor if experiencing, and symptoms that may indicate a medical emergency: CALL 911.      All questions answered.  Teach back method used.    Patient stated, "I will call my doctors office if I have a fever and/or constipated or diarrhea  ".        "

## 2020-08-26 NOTE — NURSING
Report received from ISIDRA Gauthier. Patient resting comfortably in bed, awake and alert, oriented x4. Denies any pain at this time 0/10. No acute distress noted at this time. Plan of care reviewed with patient. Instructed patient to call for assistance before ambulating, Operative site is clean and intact with Derma bond. side rails up x2, bed alarm set, call light in reach, non skid socks in use. Peripheral IV site, no redness or swelling noted.  Patient verbalized understanding of instructions. Will continue to monitor.

## 2020-08-26 NOTE — NURSING
Spoke to Dr. Juarez about patient's pain being uncontrolled. Dr. Juarez to change orders on pain medication and add Toradol.

## 2020-08-26 NOTE — DISCHARGE SUMMARY
[facility]  General Surgery  Discharge Summary      Patient Name: Asad Bonner  MRN: [id number]  Admission Date: 8/24/2020  Hospital Length of Stay: 0 days  Discharge Date and Time: 8/26/2020 12:03 PM  Attending Physician: Calos Juarez MD   Discharging Provider: Cindy Mckenzie MD  Primary Care Provider: Primary Doctor No    HPI:   26 yo M, presented to [facility] with abdominal pain, nausea/vomiting. Pain started late last night, followed my multiple bouts of emesis this morning. No fevers, chills. Pain started mid abdomen and is now located more in the RLQ. Reports recent constipation, tried laxatives and suppository without improvement in symptoms.     CT at [facility] concerning for early acute appendicitis       Procedure(s) (LRB):  APPENDECTOMY, LAPAROSCOPIC (N/A)      Indwelling Lines/Drains at time of discharge:   Lines/Drains/Airways     None               Hospital Course: Admitted with acute appendicitis, started on antibipitcs. Taken to OR for lap appy. Surgery without complication. Patient stable for discharge on POD1.      Consults:   Consults (From admission, onward)        Status Ordering Provider     Inpatient consult to General surgery  Once     Provider:  MD Jan Geiger MICHELLE N.          Significant Diagnostic Studies: CT, labs    Pending Diagnostic Studies:     Procedure Component Value Units Date/Time    Specimen to Pathology, Surgery General Surgery [[id number]] Collected: 08/25/20 1235    Order Status: Sent Lab Status: In process Updated: 08/26/20 0931        Final Active Diagnoses:    Diagnosis Date Noted POA    PRINCIPAL PROBLEM:  Appendicitis [K37] 08/24/2020 Yes      Problems Resolved During this Admission:      Discharged Condition: stable    Disposition: Home or Self Care    Follow Up:  Follow-up Information     Calos Juarez MD In 2 weeks.    Specialties: General Surgery, Surgery  Contact information:  [address]  [place]  [address]  [phone]                 Patient Instructions:      Diet Adult Regular     Lifting restrictions   Order Comments: 10-15 lbs     Notify your health care provider if you experience any of the following:  temperature >100.4     Notify your health care provider if you experience any of the following:  persistent nausea and vomiting or diarrhea     Notify your health care provider if you experience any of the following:  severe uncontrolled pain     Notify your health care provider if you experience any of the following:  redness, tenderness, or signs of infection (pain, swelling, redness, odor or green/yellow discharge around incision site)     No dressing needed     Medications:  Reconciled Home Medications:      Medication List      START taking these medications    oxyCODONE-acetaminophen 5-325 mg per tablet  Commonly known as: PERCOCET  Take 1 tablet by mouth every 4 (four) hours as needed for Pain.        CONTINUE taking these medications    EPINEPHrine 0.3 mg/0.3 mL Atin  Commonly known as: EPIPEN  Inject 0.3 mLs (0.3 mg total) into the muscle as needed.     ondansetron 4 MG Tbdl  Commonly known as: ZOFRAN-ODT  Take 1 tablet (4 mg total) by mouth every 6 (six) hours as needed (nausea).     pantoprazole 20 MG tablet  Commonly known as: PROTONIX  Take 1 tablet (20 mg total) by mouth once daily.          Time spent on the discharge of patient: 30 minutes    Cindy Mckenzie MD  General Surgery  [facility]

## 2020-08-26 NOTE — PLAN OF CARE
TN informed nurse Lulu that patient was cleared for discharge from a CM's standpoint.        08/26/20 1114   Final Note   Assessment Type Final Discharge Note   Anticipated Discharge Disposition Home   What phone number can be called within the next 1-3 days to see how you are doing after discharge? [phone]   Hospital Follow Up  Appt(s) scheduled? Yes   Discharge plans and expectations educations in teach back method with documentation complete? Yes   Right Care Referral Info   Post Acute Recommendation No Care   Post-Acute Status   Post-Acute Authorization Other   Other Status No Post-Acute Service Needs   Discharge Delays None known at this time

## 2020-08-26 NOTE — PROGRESS NOTES
TN taught Symptoms and Problems for POST OP home care with pt and significnt other/ Steve  with teach back:  1. Temp of 100.5, 2. Nausea n vomiting, 3. Constipation. TN placed education sheet in redealize..     Help at home will be from significant other, Steve, assisting in pt's recovery.    Preference for meds at this discharge, patient has prescription.     TN taught patient about things HE is responsible for when discharged TO HELP WITH HIS RECOVERY:  Particularly on how to Manage HIS Care At Home:  1. Getting his prescriptions filled.  2. Taking his medications as directed. DO NOT MISS ANY DOSES!  3. Going to his follow-up doctor appointments.   .  Christy Machado, RN, BSN, STN CCM

## 2020-08-26 NOTE — PLAN OF CARE
08/26/20 1044   Discharge Assessment   Assessment Type Discharge Planning Assessment   Confirmed/corrected address and phone number on facesheet? Yes   Assessment information obtained from? Patient   Expected Length of Stay (days)   (discharged)   Communicated expected length of stay with patient/caregiver yes   Prior to hospitilization cognitive status: Alert/Oriented   Prior to hospitalization functional status: Independent   Current cognitive status: Alert/Oriented   Current Functional Status: Independent   Lives With parent(s)   Able to Return to Prior Arrangements yes   Is patient able to care for self after discharge? Yes   Patient's perception of discharge disposition   (home with family)   Readmission Within the Last 30 Days no previous admission in last 30 days   Patient currently receives any other outside agency services? No   Equipment Currently Used at Home none   Do you have any problems affording any of your prescribed medications? TBD   Is the patient taking medications as prescribed? yes   Does the patient have transportation home? Yes   Transportation Anticipated family or friend will provide   Does the patient receive services at the [facility]? No   DME Needed Upon Discharge  none   Patient/Family in Agreement with Plan yes   Does the patient have transportation to healthcare appointments? Yes   Help at home is Michael NASH

## 2020-08-26 NOTE — PROGRESS NOTES
[facility] CASE MANAGEMENT                  WRITTEN DISCHARGE INFORMATION      APPOINTMENTS AND RESOURCES TO HELP YOU MANAGE YOUR CARE AT HOME BASED ON YOUR PREFERENCES:  (If an appointment is not scheduled for you when you leave the hospital, call your doctor to schedule a follow up visit within a week)  Follow-up Information     Calos Juarez MD On 9/10/2020.    Specialties: General Surgery, Surgery  Why: @ 9:00 am  Contact information:  [address] [place] [address]  [phone]                       Healthy Living Instructions to HELP MANAGE YOUR CARE AT HOME:  Things You are responsible for:  1.    Getting your prescriptions filled   2.    Taking your medications as directed, DO NOT MISS ANY DOSES!  3.    Following the diet and exercise recommended by your doctor  4.    Going to your follow-up doctor appointment. This is important because it allows the doctor to monitor your progress and determine if any changes need to made to your treatment plan.  5. If you have any questions about MANAGING YOUR CARE AT HOME Call the Nurse Care Line for 24/7 Assistance [phone]       Please answer any calls you may receive from [facility]. We want to continue to support you as you manage your healthcare needs. [facility] is happy to have the opportunity to serve you.      Thank you for choosing [facility] for your healthcare needs!  Your [facility] Case Management Team,

## 2020-08-26 NOTE — PLAN OF CARE
Patient remains free from injury and falls. Ambulated in hallway this shift. Passing gas. Tolerating clear liquid diet. Pain controlled with ordered analgesics. Plan of care continued.

## 2020-08-26 NOTE — PROGRESS NOTES
Pt given discharge f/u & prescription info, pt verbalized understanding and all questions addressed, pt declined WC and ambulated off MSU w/sig other at side and w/all belongings, pt ambulated w/steady gait off unit

## 2020-08-26 NOTE — NURSING
Spoke to patient's mother, Joaquin Bonner concerning patient's pain control. Explained to patient's mother that patient has has received a total of 2 mg of Diluadid and 100 mcg of Fentanyl and another 1 mg of Diluadid once patient arrived on unit from surgery. Informed her that I spoke with patient's surgeon Dr. Juarez and he will add Toradol. Explained to patient's mother that patient was educated on ambulating, but refused to due to pain. Patient's mother explained that she is a nurse and that she understands everything that I am explaining to her.

## 2020-08-28 LAB
FINAL PATHOLOGIC DIAGNOSIS: NORMAL
GROSS: NORMAL

## 2020-08-29 LAB
BACTERIA BLD CULT: NORMAL
BACTERIA BLD CULT: NORMAL

## 2020-09-01 NOTE — ANESTHESIA POSTPROCEDURE EVALUATION
Anesthesia Post Evaluation    Patient: Asad Bonner    Procedure(s) Performed: Procedure(s) (LRB):  APPENDECTOMY, LAPAROSCOPIC (N/A)    Final Anesthesia Type: general    Patient location during evaluation: PACU  Patient participation: Yes- Able to Participate  Level of consciousness: awake and alert  Post-procedure vital signs: reviewed and stable  Pain management: adequate  Airway patency: patent    PONV status at discharge: No PONV  Anesthetic complications: no      Cardiovascular status: blood pressure returned to baseline and hemodynamically stable  Respiratory status: unassisted and spontaneous ventilation  Hydration status: euvolemic  Follow-up not needed.          Vitals Value Taken Time   /76 08/26/20 0546   Temp 36.9 °C (98.4 °F) 08/26/20 0546   Pulse 65 08/26/20 0755   Resp 18 08/26/20 0755   SpO2 98 % 08/26/20 0755         Event Time   Out of Recovery 08/25/2020 14:22:48         Pain/Will Score: No data recorded

## 2020-09-07 ENCOUNTER — NURSE TRIAGE (OUTPATIENT)
Dept: ADMINISTRATIVE | Facility: CLINIC | Age: 25
End: 2020-09-07

## 2020-09-07 NOTE — TELEPHONE ENCOUNTER
Contacted patient on behalf of [facility]'s post procedure system tracker  Patient denies any covid 19 symptoms

## 2020-09-07 NOTE — TELEPHONE ENCOUNTER
Reason for Disposition   Health Information question, no triage required and triager able to answer question    Additional Information   Negative: [1] Caller is not with the adult (patient) AND [2] reporting urgent symptoms   Negative: Lab result questions   Negative: Medication questions   Negative: Caller can't be reached by phone   Negative: Caller has already spoken to PCP or another triager   Negative: Requesting regular office appointment   Negative: [1] Caller requesting NON-URGENT health information AND [2] PCP's office is the best resource    Protocols used: INFORMATION ONLY CALL - NO TRIAGE-A-

## 2021-03-26 ENCOUNTER — HOSPITAL ENCOUNTER (EMERGENCY)
Facility: HOSPITAL | Age: 26
Discharge: HOME | End: 2021-03-26
Attending: EMERGENCY MEDICINE

## 2021-03-26 VITALS
WEIGHT: 171 LBS | TEMPERATURE: 99 F | OXYGEN SATURATION: 100 % | HEIGHT: 67 IN | HEART RATE: 66 BPM | BODY MASS INDEX: 26.84 KG/M2 | RESPIRATION RATE: 18 BRPM | DIASTOLIC BLOOD PRESSURE: 76 MMHG | SYSTOLIC BLOOD PRESSURE: 117 MMHG

## 2021-03-26 DIAGNOSIS — M79.18 BUTTOCK PAIN: Primary | ICD-10-CM

## 2021-03-26 LAB
ALBUMIN SERPL-MCNC: 3.8 G/DL (ref 3.3–5.5)
ALP SERPL-CCNC: 67 U/L (ref 42–141)
BILIRUB SERPL-MCNC: 0.5 MG/DL (ref 0.2–1.6)
BUN SERPL-MCNC: 8 MG/DL (ref 7–22)
CALCIUM SERPL-MCNC: 10 MG/DL (ref 8–10.3)
CHLORIDE SERPL-SCNC: 105 MMOL/L (ref 98–108)
CREAT SERPL-MCNC: 1 MG/DL (ref 0.6–1.2)
GLUCOSE SERPL-MCNC: 94 MG/DL (ref 73–118)
POC ALT (SGPT): 16 U/L (ref 10–47)
POC AST (SGOT): 26 U/L (ref 11–38)
POC TCO2: 28 MMOL/L (ref 18–33)
POTASSIUM BLD-SCNC: 4.1 MMOL/L (ref 3.6–5.1)
PROTEIN, POC: 7.4 G/DL (ref 6.4–8.1)
SODIUM BLD-SCNC: 144 MMOL/L (ref 128–145)

## 2021-03-26 PROCEDURE — 85025 COMPLETE CBC W/AUTO DIFF WBC: CPT | Mod: ER

## 2021-03-26 PROCEDURE — 25000003 PHARM REV CODE 250: Mod: ER | Performed by: NURSE PRACTITIONER

## 2021-03-26 PROCEDURE — 63600175 PHARM REV CODE 636 W HCPCS: Mod: ER | Performed by: NURSE PRACTITIONER

## 2021-03-26 PROCEDURE — 96361 HYDRATE IV INFUSION ADD-ON: CPT | Mod: ER

## 2021-03-26 PROCEDURE — 99285 EMERGENCY DEPT VISIT HI MDM: CPT | Mod: 25,ER

## 2021-03-26 PROCEDURE — 80053 COMPREHEN METABOLIC PANEL: CPT | Mod: ER

## 2021-03-26 PROCEDURE — 96375 TX/PRO/DX INJ NEW DRUG ADDON: CPT | Mod: ER

## 2021-03-26 PROCEDURE — 96374 THER/PROPH/DIAG INJ IV PUSH: CPT | Mod: ER

## 2021-03-26 PROCEDURE — 25500020 PHARM REV CODE 255: Mod: ER | Performed by: EMERGENCY MEDICINE

## 2021-03-26 RX ORDER — KETOROLAC TROMETHAMINE 30 MG/ML
10 INJECTION, SOLUTION INTRAMUSCULAR; INTRAVENOUS
Status: COMPLETED | OUTPATIENT
Start: 2021-03-26 | End: 2021-03-26

## 2021-03-26 RX ORDER — SULFAMETHOXAZOLE AND TRIMETHOPRIM 800; 160 MG/1; MG/1
1 TABLET ORAL
Status: COMPLETED | OUTPATIENT
Start: 2021-03-26 | End: 2021-03-26

## 2021-03-26 RX ORDER — SULFAMETHOXAZOLE AND TRIMETHOPRIM 800; 160 MG/1; MG/1
1 TABLET ORAL 2 TIMES DAILY
Qty: 14 TABLET | Refills: 0 | Status: SHIPPED | OUTPATIENT
Start: 2021-03-26 | End: 2021-04-02

## 2021-03-26 RX ORDER — METHYLPREDNISOLONE SOD SUCC 125 MG
125 VIAL (EA) INJECTION
Status: COMPLETED | OUTPATIENT
Start: 2021-03-26 | End: 2021-03-26

## 2021-03-26 RX ORDER — DIPHENHYDRAMINE HYDROCHLORIDE 50 MG/ML
50 INJECTION INTRAMUSCULAR; INTRAVENOUS
Status: COMPLETED | OUTPATIENT
Start: 2021-03-26 | End: 2021-03-26

## 2021-03-26 RX ORDER — SULINDAC 150 MG/1
150 TABLET ORAL 2 TIMES DAILY
Qty: 10 TABLET | Refills: 0 | Status: SHIPPED | OUTPATIENT
Start: 2021-03-26 | End: 2021-03-31

## 2021-03-26 RX ADMIN — KETOROLAC TROMETHAMINE 10 MG: 30 INJECTION, SOLUTION INTRAMUSCULAR; INTRAVENOUS at 06:03

## 2021-03-26 RX ADMIN — DIPHENHYDRAMINE HYDROCHLORIDE 50 MG: 50 INJECTION INTRAMUSCULAR; INTRAVENOUS at 06:03

## 2021-03-26 RX ADMIN — IOHEXOL 100 ML: 350 INJECTION, SOLUTION INTRAVENOUS at 06:03

## 2021-03-26 RX ADMIN — METHYLPREDNISOLONE SODIUM SUCCINATE 125 MG: 125 INJECTION, POWDER, FOR SOLUTION INTRAMUSCULAR; INTRAVENOUS at 06:03

## 2021-03-26 RX ADMIN — SODIUM CHLORIDE 1000 ML: 0.9 INJECTION, SOLUTION INTRAVENOUS at 06:03

## 2021-03-26 RX ADMIN — SULFAMETHOXAZOLE AND TRIMETHOPRIM 1 TABLET: 800; 160 TABLET ORAL at 07:03

## 2021-07-22 ENCOUNTER — HOSPITAL ENCOUNTER (EMERGENCY)
Facility: HOSPITAL | Age: 26
Discharge: HOME | End: 2021-07-22
Attending: INTERNAL MEDICINE

## 2021-07-22 VITALS
RESPIRATION RATE: 14 BRPM | TEMPERATURE: 98 F | HEART RATE: 66 BPM | OXYGEN SATURATION: 100 % | DIASTOLIC BLOOD PRESSURE: 71 MMHG | BODY MASS INDEX: 25.58 KG/M2 | WEIGHT: 163 LBS | HEIGHT: 67 IN | SYSTOLIC BLOOD PRESSURE: 120 MMHG

## 2021-07-22 DIAGNOSIS — H16.133 FLASH BURN OF BOTH EYES: Primary | ICD-10-CM

## 2021-07-22 PROCEDURE — 99283 EMERGENCY DEPT VISIT LOW MDM: CPT | Mod: ER

## 2021-07-22 PROCEDURE — 25000003 PHARM REV CODE 250: Mod: ER | Performed by: INTERNAL MEDICINE

## 2021-07-22 RX ORDER — TOBRAMYCIN AND DEXAMETHASONE 3; 1 MG/ML; MG/ML
2 SUSPENSION/ DROPS OPHTHALMIC
Status: COMPLETED | OUTPATIENT
Start: 2021-07-22 | End: 2021-07-22

## 2021-07-22 RX ORDER — TOBRAMYCIN AND DEXAMETHASONE 3; 1 MG/ML; MG/ML
2 SUSPENSION/ DROPS OPHTHALMIC
Qty: 5 ML | Refills: 0 | Status: SHIPPED | OUTPATIENT
Start: 2021-07-22 | End: 2021-07-27

## 2021-07-22 RX ADMIN — TOBRAMYCIN AND DEXAMETHASONE 2 DROP: 3; 1 SUSPENSION/ DROPS OPHTHALMIC at 04:07

## 2022-08-14 ENCOUNTER — HOSPITAL ENCOUNTER (EMERGENCY)
Facility: HOSPITAL | Age: 27
Discharge: HOME | End: 2022-08-14
Attending: INTERNAL MEDICINE

## 2022-08-14 VITALS
BODY MASS INDEX: 24.11 KG/M2 | DIASTOLIC BLOOD PRESSURE: 84 MMHG | TEMPERATURE: 98 F | OXYGEN SATURATION: 100 % | HEART RATE: 78 BPM | SYSTOLIC BLOOD PRESSURE: 121 MMHG | WEIGHT: 150 LBS | RESPIRATION RATE: 17 BRPM | HEIGHT: 66 IN

## 2022-08-14 DIAGNOSIS — R07.89 CHEST WALL PAIN: ICD-10-CM

## 2022-08-14 LAB
POC CARDIAC TROPONIN I: 0 NG/ML
SAMPLE: NORMAL

## 2022-08-14 PROCEDURE — 25000003 PHARM REV CODE 250: Mod: ER | Performed by: INTERNAL MEDICINE

## 2022-08-14 PROCEDURE — 99284 EMERGENCY DEPT VISIT MOD MDM: CPT | Mod: 25,ER

## 2022-08-14 PROCEDURE — 84484 ASSAY OF TROPONIN QUANT: CPT | Mod: ER

## 2022-08-14 RX ORDER — IBUPROFEN 400 MG/1
800 TABLET ORAL
Status: COMPLETED | OUTPATIENT
Start: 2022-08-14 | End: 2022-08-14

## 2022-08-14 RX ADMIN — IBUPROFEN 800 MG: 400 TABLET ORAL at 10:08

## 2022-08-15 NOTE — ED PROVIDER NOTES
Encounter Date: 8/14/2022    SCRIBE #1 NOTE: I, Ada Esparza, am scribing for, and in the presence of,  Howard Griggs MD. I have scribed the following portions of the note - Other sections scribed: HPI, ROS, PE.       History     Chief Complaint   Patient presents with    Chest Pain     Pt c/o left sided chest pressure since Sunday that radiates to left side of neck.      Asad Bonner 27 y.o. male, with PMHx of Asthma, presents to the ED with left sided chest pressure onset 7 days ago that radiated to the left side of the neck 4 days ago. Movement is an aggravating factor for the neck pain.    The history is provided by the patient. No  was used.     Review of patient's allergies indicates:   Allergen Reactions    Shellfish containing products      Past Medical History:   Diagnosis Date    Asthma      Past Surgical History:   Procedure Laterality Date    LAPAROSCOPIC APPENDECTOMY N/A 8/25/2020    Procedure: APPENDECTOMY, LAPAROSCOPIC;  Surgeon: Calos Juarez MD;  Location: [facility] OR;  Service: General;  Laterality: N/A;     History reviewed. No pertinent family history.  Social History     Tobacco Use    Smoking status: Current Every Day Smoker     Types: Cigars    Smokeless tobacco: Never Used   Substance Use Topics    Alcohol use: Yes     Alcohol/week: 1.0 standard drink     Types: 1 Cans of beer per week    Drug use: Yes     Types: Marijuana     Review of Systems   Cardiovascular: Positive for chest pain.   Musculoskeletal: Positive for neck pain.   All other systems reviewed and are negative.      Physical Exam     Initial Vitals [08/14/22 2148]   BP Pulse Resp Temp SpO2   121/84 78 17 98.4 °F (36.9 °C) 100 %      MAP       --         Physical Exam    Nursing note and vitals reviewed.  Constitutional: He appears well-developed and well-nourished.   HENT:   Head: Normocephalic and atraumatic.   Eyes: Conjunctivae are normal.   Neck: Neck supple.   Left lateral neck muscle pain upon  movement with no tenderness to palpation or edema.   Normal range of motion.  Cardiovascular: Normal rate, regular rhythm and normal heart sounds. Exam reveals no gallop and no friction rub.    No murmur heard.  Pulmonary/Chest: Breath sounds normal. No respiratory distress. He has no wheezes. He has no rhonchi. He has no rales.   Abdominal: Abdomen is soft. There is no abdominal tenderness.   Musculoskeletal:         General: No edema. Normal range of motion.      Cervical back: Normal range of motion and neck supple. No edema.     Neurological: He is alert and oriented to person, place, and time. GCS score is 15. GCS eye subscore is 4. GCS verbal subscore is 5. GCS motor subscore is 6.   Skin: Skin is warm and dry.   Psychiatric: He has a normal mood and affect.         ED Course   Procedures  Labs Reviewed   TROPONIN ISTAT   POCT TROPONIN     EKG Readings: (Independently Interpreted)   Rhythm: Normal Sinus Rhythm. Heart Rate: 80. Ectopy: No Ectopy. Conduction: Normal. ST Segments: Normal ST Segments. T Waves: Normal. Clinical Impression: Normal Sinus Rhythm       Imaging Results          X-Ray Chest AP Portable (Final result)  Result time 08/14/22 22:55:37    Final result by Moshe Kline MD (08/14/22 22:55:37)                 Impression:      No acute cardiopulmonary process identified.      Electronically signed by: Moshe Kline MD  Date:    08/14/2022  Time:    22:55             Narrative:    EXAMINATION:  XR CHEST AP PORTABLE    CLINICAL HISTORY:  Other chest pain    TECHNIQUE:  Single frontal view of the chest was performed.    COMPARISON:  August 2020.    FINDINGS:  Cardiac silhouette is normal in size.  Lungs are symmetrically expanded.  No evidence of focal consolidative process, pneumothorax, or significant pleural effusion.  No acute osseous abnormality identified.                                 Medications   ibuprofen tablet 800 mg (800 mg Oral Given 8/14/22 2121)     Medical Decision Making:    History:   Old Medical Records: I decided to obtain old medical records.  Initial Assessment:   Asad Bonner 27 y.o. male, with PMHx of Asthma, presents to the ED with left sided chest pressure onset 7 days ago that radiated to the left side of the neck 4 days ago. Movement is an aggravating factor for the neck pain.  Clinical Tests:   Lab Tests: Ordered and Reviewed  Radiological Study: Ordered and Reviewed  ED Management:  Troponin was normal as well as EKG and chest x-ray.  Patient was given instructions for noncardiac chest pain and advised to follow-up with his primary care physician within the next week for re-evaluation/return to the emergency department if condition worsens.          Scribe Attestation:   Scribe #1: I performed the above scribed service and the documentation accurately describes the services I performed. I attest to the accuracy of the note.               This document was produced by a scribe under my direction and in my presence. I agree with the content of the note and have made any necessary edits.     Dr. Griggs    08/15/2022 4:34 AM    Clinical Impression:   Final diagnoses:  [R07.89] Chest wall pain          ED Disposition Condition    Discharge Stable        ED Prescriptions     None        Follow-up Information    None          Howard Griggs MD  08/15/22 [id number]

## 2022-08-15 NOTE — ED TRIAGE NOTES
Presents to the ED with midsternal chest pain x 21 week that started radiating to neck x 5 days ago. Reports chest congestion. + sick contact x 2 weeks ago. Denies N/V, ABD pain or SOB.

## 2023-02-15 ENCOUNTER — OCCUPATIONAL HEALTH (OUTPATIENT)
Dept: URGENT CARE | Facility: CLINIC | Age: 28
End: 2023-02-15

## 2023-02-15 DIAGNOSIS — Z00.00 ENCOUNTER FOR PHYSICAL EXAMINATION: Primary | ICD-10-CM

## 2023-02-15 DIAGNOSIS — Z02.83 ENCOUNTER FOR DRUG SCREENING: ICD-10-CM

## 2023-02-15 LAB
CTP QC/QA: YES
POC 10 PANEL DRUG SCREEN: ABNORMAL

## 2023-02-15 PROCEDURE — 97750 PHYSICAL PERFORMANCE TEST: CPT | Mod: S$GLB,,, | Performed by: STUDENT IN AN ORGANIZED HEALTH CARE EDUCATION/TRAINING PROGRAM

## 2023-02-15 PROCEDURE — 94010 PULMONARY FUNCTION SCREENING (OCC MED PHYSICALS): ICD-10-PCS | Mod: S$GLB,,, | Performed by: PHYSICIAN ASSISTANT

## 2023-02-15 PROCEDURE — 80305 DRUG TEST PRSMV DIR OPT OBS: CPT | Mod: S$GLB,,, | Performed by: PHYSICIAN ASSISTANT

## 2023-02-15 PROCEDURE — 99080 SPECIAL REPORTS OR FORMS: CPT | Mod: S$GLB,,, | Performed by: PHYSICIAN ASSISTANT

## 2023-02-15 PROCEDURE — 97750 AGILITY TEST: ICD-10-PCS | Mod: S$GLB,,, | Performed by: STUDENT IN AN ORGANIZED HEALTH CARE EDUCATION/TRAINING PROGRAM

## 2023-02-15 PROCEDURE — 94010 BREATHING CAPACITY TEST: CPT | Mod: S$GLB,,, | Performed by: PHYSICIAN ASSISTANT

## 2023-02-15 PROCEDURE — 80305 OOH NON-DOT DRUG SCREEN: ICD-10-PCS | Mod: S$GLB,,, | Performed by: PHYSICIAN ASSISTANT

## 2023-02-15 PROCEDURE — 99499 UNLISTED E&M SERVICE: CPT | Mod: S$GLB,,, | Performed by: PHYSICIAN ASSISTANT

## 2023-02-15 PROCEDURE — 99080 OSHA QUESTIONNAIRE: ICD-10-PCS | Mod: S$GLB,,, | Performed by: PHYSICIAN ASSISTANT

## 2023-02-15 PROCEDURE — 99499 PHYSICAL, BASIC COMPLEXITY: ICD-10-PCS | Mod: S$GLB,,, | Performed by: PHYSICIAN ASSISTANT

## 2025-05-29 ENCOUNTER — HOSPITAL ENCOUNTER (EMERGENCY)
Facility: HOSPITAL | Age: 30
Discharge: HOME | End: 2025-05-29
Attending: EMERGENCY MEDICINE

## 2025-05-29 VITALS
OXYGEN SATURATION: 99 % | DIASTOLIC BLOOD PRESSURE: 75 MMHG | TEMPERATURE: 98 F | RESPIRATION RATE: 18 BRPM | SYSTOLIC BLOOD PRESSURE: 116 MMHG | HEART RATE: 65 BPM

## 2025-05-29 DIAGNOSIS — M62.830 LUMBAR PARASPINAL MUSCLE SPASM: Primary | ICD-10-CM

## 2025-05-29 LAB
BILIRUB UR QL STRIP.AUTO: NEGATIVE
CLARITY UR: CLEAR
COLOR UR AUTO: YELLOW
GLUCOSE UR QL STRIP: NEGATIVE
HGB UR QL STRIP: NEGATIVE
HOLD SPECIMEN: NORMAL
KETONES UR QL STRIP: ABNORMAL
LEUKOCYTE ESTERASE UR QL STRIP: NEGATIVE
NITRITE UR QL STRIP: NEGATIVE
PH UR STRIP: 6 [PH]
PROT UR QL STRIP: ABNORMAL
SP GR UR STRIP: 1.03
UROBILINOGEN UR STRIP-ACNC: NEGATIVE EU/DL

## 2025-05-29 PROCEDURE — 25000003 PHARM REV CODE 250: Performed by: PHYSICIAN ASSISTANT

## 2025-05-29 PROCEDURE — 81003 URINALYSIS AUTO W/O SCOPE: CPT

## 2025-05-29 PROCEDURE — 87491 CHLMYD TRACH DNA AMP PROBE: CPT | Performed by: PHYSICIAN ASSISTANT

## 2025-05-29 PROCEDURE — 63600175 PHARM REV CODE 636 W HCPCS: Mod: JZ,TB | Performed by: PHYSICIAN ASSISTANT

## 2025-05-29 PROCEDURE — 99284 EMERGENCY DEPT VISIT MOD MDM: CPT | Mod: 25

## 2025-05-29 PROCEDURE — 96372 THER/PROPH/DIAG INJ SC/IM: CPT | Performed by: PHYSICIAN ASSISTANT

## 2025-05-29 RX ORDER — ORPHENADRINE CITRATE 100 MG/1
100 TABLET, EXTENDED RELEASE ORAL ONCE
Status: COMPLETED | OUTPATIENT
Start: 2025-05-29 | End: 2025-05-29

## 2025-05-29 RX ORDER — MELOXICAM 7.5 MG/1
7.5 TABLET ORAL DAILY
Qty: 12 TABLET | Refills: 0 | Status: SHIPPED | OUTPATIENT
Start: 2025-05-29

## 2025-05-29 RX ORDER — ORPHENADRINE CITRATE 100 MG/1
100 TABLET, EXTENDED RELEASE ORAL 2 TIMES DAILY
Qty: 10 TABLET | Refills: 0 | Status: SHIPPED | OUTPATIENT
Start: 2025-05-29 | End: 2025-06-03

## 2025-05-29 RX ORDER — KETOROLAC TROMETHAMINE 30 MG/ML
30 INJECTION, SOLUTION INTRAMUSCULAR; INTRAVENOUS
Status: COMPLETED | OUTPATIENT
Start: 2025-05-29 | End: 2025-05-29

## 2025-05-29 RX ADMIN — KETOROLAC TROMETHAMINE 30 MG: 30 INJECTION, SOLUTION INTRAMUSCULAR at 07:05

## 2025-05-29 RX ADMIN — ORPHENADRINE CITRATE 100 MG: 100 TABLET, EXTENDED RELEASE ORAL at 07:05

## 2025-05-31 LAB
C TRACH DNA SPEC QL NAA+PROBE: NOT DETECTED
CTGC SOURCE (OHS) ORD-325: NORMAL
N GONORRHOEA DNA UR QL NAA+PROBE: NOT DETECTED

## 2025-07-25 ENCOUNTER — PATIENT MESSAGE (OUTPATIENT)
Dept: UROLOGY | Facility: CLINIC | Age: 30
End: 2025-07-25

## (undated) DEVICE — SYR 10CC LUER LOCK

## (undated) DEVICE — STAPLER ECHELON FLEX GST 45MM

## (undated) DEVICE — SUT MONOCRYL 4-0 PS-2

## (undated) DEVICE — TRAY FOLEY 16FR INFECTION CONT

## (undated) DEVICE — CLIPPER BLADE MOD 4406 (CAREF)

## (undated) DEVICE — KIT ANTIFOG

## (undated) DEVICE — ADHESIVE DERMABOND MINI HV

## (undated) DEVICE — UNDERGLOVES BIOGEL PI SIZE 7.5

## (undated) DEVICE — KITTNER ENDOSCOPIC SINGLE TIP

## (undated) DEVICE — PACK ENDOSCOPY GENERAL

## (undated) DEVICE — COVER OVERHEAD SURG LT BLUE

## (undated) DEVICE — TROCAR KII BLLN 12MM 10CM

## (undated) DEVICE — GLOVE SURGICAL LATEX SZ 7

## (undated) DEVICE — ELECTRODE REM PLYHSV RETURN 9

## (undated) DEVICE — APPLICATOR CHLORAPREP ORN 26ML

## (undated) DEVICE — SCISSOR CURVED ENDOPATH 5MM

## (undated) DEVICE — TUBING INSUFFLATION 10

## (undated) DEVICE — RELOAD ECHELON ENDOPATH 45MM

## (undated) DEVICE — TROCAR ENDOPATH XCEL 5X100MM

## (undated) DEVICE — Device

## (undated) DEVICE — SUT ETHIBOND 0 CR/CT-2 8-18

## (undated) DEVICE — BAG TISS RETRV MONARCH 10MM

## (undated) DEVICE — NDL HYPO REG 25G X 1 1/2

## (undated) DEVICE — BLANKET UPPER BODY 78.7X29.9IN